# Patient Record
Sex: FEMALE | Race: WHITE | NOT HISPANIC OR LATINO | Employment: OTHER | URBAN - METROPOLITAN AREA
[De-identification: names, ages, dates, MRNs, and addresses within clinical notes are randomized per-mention and may not be internally consistent; named-entity substitution may affect disease eponyms.]

---

## 2019-08-05 ENCOUNTER — HOSPITAL ENCOUNTER (INPATIENT)
Facility: CLINIC | Age: 64
LOS: 1 days | Discharge: HOME | DRG: 393 | End: 2019-08-07
Attending: EMERGENCY MEDICINE | Admitting: INTERNAL MEDICINE
Payer: COMMERCIAL

## 2019-08-05 DIAGNOSIS — K83.09 CHOLANGITIS (H): ICD-10-CM

## 2019-08-05 DIAGNOSIS — K85.10 ACUTE BILIARY PANCREATITIS, UNSPECIFIED COMPLICATION STATUS: ICD-10-CM

## 2019-08-05 LAB
BASOPHILS # BLD AUTO: 0.1 10E9/L (ref 0–0.2)
BASOPHILS NFR BLD AUTO: 0.6 %
DIFFERENTIAL METHOD BLD: NORMAL
EOSINOPHIL # BLD AUTO: 0.1 10E9/L (ref 0–0.7)
EOSINOPHIL NFR BLD AUTO: 1.5 %
ERYTHROCYTE [DISTWIDTH] IN BLOOD BY AUTOMATED COUNT: 13.2 % (ref 10–15)
HCT VFR BLD AUTO: 45.4 % (ref 35–47)
HGB BLD-MCNC: 15.3 G/DL (ref 11.7–15.7)
IMM GRANULOCYTES # BLD: 0 10E9/L (ref 0–0.4)
IMM GRANULOCYTES NFR BLD: 0.3 %
LYMPHOCYTES # BLD AUTO: 1.7 10E9/L (ref 0.8–5.3)
LYMPHOCYTES NFR BLD AUTO: 18.6 %
MCH RBC QN AUTO: 31.6 PG (ref 26.5–33)
MCHC RBC AUTO-ENTMCNC: 33.7 G/DL (ref 31.5–36.5)
MCV RBC AUTO: 94 FL (ref 78–100)
MONOCYTES # BLD AUTO: 0.6 10E9/L (ref 0–1.3)
MONOCYTES NFR BLD AUTO: 6.3 %
NEUTROPHILS # BLD AUTO: 6.4 10E9/L (ref 1.6–8.3)
NEUTROPHILS NFR BLD AUTO: 72.7 %
NRBC # BLD AUTO: 0 10*3/UL
NRBC BLD AUTO-RTO: 0 /100
PLATELET # BLD AUTO: 231 10E9/L (ref 150–450)
RBC # BLD AUTO: 4.84 10E12/L (ref 3.8–5.2)
WBC # BLD AUTO: 8.9 10E9/L (ref 4–11)

## 2019-08-05 PROCEDURE — 83690 ASSAY OF LIPASE: CPT | Performed by: EMERGENCY MEDICINE

## 2019-08-05 PROCEDURE — 93005 ELECTROCARDIOGRAM TRACING: CPT

## 2019-08-05 PROCEDURE — 83605 ASSAY OF LACTIC ACID: CPT | Performed by: EMERGENCY MEDICINE

## 2019-08-05 PROCEDURE — 25000128 H RX IP 250 OP 636

## 2019-08-05 PROCEDURE — 85025 COMPLETE CBC W/AUTO DIFF WBC: CPT | Performed by: EMERGENCY MEDICINE

## 2019-08-05 PROCEDURE — 80053 COMPREHEN METABOLIC PANEL: CPT | Performed by: EMERGENCY MEDICINE

## 2019-08-05 PROCEDURE — 25000128 H RX IP 250 OP 636: Performed by: EMERGENCY MEDICINE

## 2019-08-05 PROCEDURE — 87040 BLOOD CULTURE FOR BACTERIA: CPT | Performed by: EMERGENCY MEDICINE

## 2019-08-05 PROCEDURE — 99223 1ST HOSP IP/OBS HIGH 75: CPT | Mod: AI | Performed by: INTERNAL MEDICINE

## 2019-08-05 PROCEDURE — 84484 ASSAY OF TROPONIN QUANT: CPT | Performed by: EMERGENCY MEDICINE

## 2019-08-05 PROCEDURE — 82805 BLOOD GASES W/O2 SATURATION: CPT | Performed by: EMERGENCY MEDICINE

## 2019-08-05 PROCEDURE — 96361 HYDRATE IV INFUSION ADD-ON: CPT

## 2019-08-05 PROCEDURE — 99285 EMERGENCY DEPT VISIT HI MDM: CPT | Mod: 25

## 2019-08-05 PROCEDURE — 96375 TX/PRO/DX INJ NEW DRUG ADDON: CPT

## 2019-08-05 RX ORDER — HYDROMORPHONE HYDROCHLORIDE 1 MG/ML
INJECTION, SOLUTION INTRAMUSCULAR; INTRAVENOUS; SUBCUTANEOUS
Status: COMPLETED
Start: 2019-08-05 | End: 2019-08-05

## 2019-08-05 RX ORDER — HYDROMORPHONE HYDROCHLORIDE 1 MG/ML
0.5 INJECTION, SOLUTION INTRAMUSCULAR; INTRAVENOUS; SUBCUTANEOUS
Status: DISCONTINUED | OUTPATIENT
Start: 2019-08-05 | End: 2019-08-06

## 2019-08-05 RX ORDER — SODIUM CHLORIDE 9 MG/ML
1000 INJECTION, SOLUTION INTRAVENOUS CONTINUOUS
Status: DISCONTINUED | OUTPATIENT
Start: 2019-08-05 | End: 2019-08-07 | Stop reason: HOSPADM

## 2019-08-05 RX ORDER — ONDANSETRON 2 MG/ML
4 INJECTION INTRAMUSCULAR; INTRAVENOUS ONCE
Status: COMPLETED | OUTPATIENT
Start: 2019-08-05 | End: 2019-08-06

## 2019-08-05 RX ORDER — ONDANSETRON 2 MG/ML
INJECTION INTRAMUSCULAR; INTRAVENOUS
Status: COMPLETED
Start: 2019-08-05 | End: 2019-08-06

## 2019-08-05 RX ADMIN — SODIUM CHLORIDE 1000 ML: 9 INJECTION, SOLUTION INTRAVENOUS at 23:50

## 2019-08-05 RX ADMIN — HYDROMORPHONE HYDROCHLORIDE 0.5 MG: 1 INJECTION, SOLUTION INTRAMUSCULAR; INTRAVENOUS; SUBCUTANEOUS at 23:50

## 2019-08-05 ASSESSMENT — ENCOUNTER SYMPTOMS
NAUSEA: 1
ABDOMINAL PAIN: 1
FEVER: 0
DIARRHEA: 0
CONSTIPATION: 0

## 2019-08-06 PROBLEM — K80.50 CHOLEDOCHOLITHIASIS: Status: ACTIVE | Noted: 2019-08-06

## 2019-08-06 LAB
ALBUMIN SERPL-MCNC: 3.4 G/DL (ref 3.4–5)
ALBUMIN SERPL-MCNC: 4 G/DL (ref 3.4–5)
ALP SERPL-CCNC: 270 U/L (ref 40–150)
ALP SERPL-CCNC: 330 U/L (ref 40–150)
ALT SERPL W P-5'-P-CCNC: 1000 U/L (ref 0–50)
ALT SERPL W P-5'-P-CCNC: 885 U/L (ref 0–50)
ANION GAP SERPL CALCULATED.3IONS-SCNC: 6 MMOL/L (ref 3–14)
ANION GAP SERPL CALCULATED.3IONS-SCNC: 9 MMOL/L (ref 3–14)
AST SERPL W P-5'-P-CCNC: 546 U/L (ref 0–45)
AST SERPL W P-5'-P-CCNC: 592 U/L (ref 0–45)
BASE EXCESS BLDV CALC-SCNC: 1.3 MMOL/L
BASOPHILS # BLD AUTO: 0 10E9/L (ref 0–0.2)
BASOPHILS NFR BLD AUTO: 0.3 %
BILIRUB SERPL-MCNC: 4.3 MG/DL (ref 0.2–1.3)
BILIRUB SERPL-MCNC: 5.1 MG/DL (ref 0.2–1.3)
BUN SERPL-MCNC: 8 MG/DL (ref 7–30)
BUN SERPL-MCNC: 9 MG/DL (ref 7–30)
CALCIUM SERPL-MCNC: 8.6 MG/DL (ref 8.5–10.1)
CALCIUM SERPL-MCNC: 9.5 MG/DL (ref 8.5–10.1)
CHLORIDE SERPL-SCNC: 106 MMOL/L (ref 94–109)
CHLORIDE SERPL-SCNC: 109 MMOL/L (ref 94–109)
CO2 SERPL-SCNC: 24 MMOL/L (ref 20–32)
CO2 SERPL-SCNC: 26 MMOL/L (ref 20–32)
CREAT SERPL-MCNC: 0.7 MG/DL (ref 0.52–1.04)
CREAT SERPL-MCNC: 0.75 MG/DL (ref 0.52–1.04)
DIFFERENTIAL METHOD BLD: NORMAL
EOSINOPHIL # BLD AUTO: 0.1 10E9/L (ref 0–0.7)
EOSINOPHIL NFR BLD AUTO: 0.5 %
ERYTHROCYTE [DISTWIDTH] IN BLOOD BY AUTOMATED COUNT: 13.4 % (ref 10–15)
GFR SERPL CREATININE-BSD FRML MDRD: 85 ML/MIN/{1.73_M2}
GFR SERPL CREATININE-BSD FRML MDRD: >90 ML/MIN/{1.73_M2}
GLUCOSE SERPL-MCNC: 122 MG/DL (ref 70–99)
GLUCOSE SERPL-MCNC: 144 MG/DL (ref 70–99)
HCO3 BLDV-SCNC: 23 MMOL/L (ref 21–28)
HCT VFR BLD AUTO: 41.5 % (ref 35–47)
HGB BLD-MCNC: 13.4 G/DL (ref 11.7–15.7)
IMM GRANULOCYTES # BLD: 0 10E9/L (ref 0–0.4)
IMM GRANULOCYTES NFR BLD: 0.3 %
INR PPP: 0.88 (ref 0.86–1.14)
INTERPRETATION ECG - MUSE: NORMAL
LACTATE BLD-SCNC: 1.5 MMOL/L (ref 0.7–2)
LIPASE SERPL-CCNC: ABNORMAL U/L (ref 73–393)
LYMPHOCYTES # BLD AUTO: 1.1 10E9/L (ref 0.8–5.3)
LYMPHOCYTES NFR BLD AUTO: 12.5 %
MCH RBC QN AUTO: 31.2 PG (ref 26.5–33)
MCHC RBC AUTO-ENTMCNC: 32.3 G/DL (ref 31.5–36.5)
MCV RBC AUTO: 97 FL (ref 78–100)
MONOCYTES # BLD AUTO: 0.6 10E9/L (ref 0–1.3)
MONOCYTES NFR BLD AUTO: 6.6 %
NEUTROPHILS # BLD AUTO: 7.3 10E9/L (ref 1.6–8.3)
NEUTROPHILS NFR BLD AUTO: 79.8 %
NRBC # BLD AUTO: 0 10*3/UL
NRBC BLD AUTO-RTO: 0 /100
O2/TOTAL GAS SETTING VFR VENT: ABNORMAL %
OXYHGB MFR BLDV: 87 %
PCO2 BLDV: 28 MM HG (ref 40–50)
PH BLDV: 7.53 PH (ref 7.32–7.43)
PLATELET # BLD AUTO: 183 10E9/L (ref 150–450)
PO2 BLDV: 45 MM HG (ref 25–47)
POTASSIUM SERPL-SCNC: 3.9 MMOL/L (ref 3.4–5.3)
POTASSIUM SERPL-SCNC: 4 MMOL/L (ref 3.4–5.3)
PROT SERPL-MCNC: 6.1 G/DL (ref 6.8–8.8)
PROT SERPL-MCNC: 7.5 G/DL (ref 6.8–8.8)
RBC # BLD AUTO: 4.3 10E12/L (ref 3.8–5.2)
SODIUM SERPL-SCNC: 139 MMOL/L (ref 133–144)
SODIUM SERPL-SCNC: 141 MMOL/L (ref 133–144)
TROPONIN I SERPL-MCNC: <0.015 UG/L (ref 0–0.04)
WBC # BLD AUTO: 9.1 10E9/L (ref 4–11)

## 2019-08-06 PROCEDURE — 85610 PROTHROMBIN TIME: CPT | Performed by: PHYSICIAN ASSISTANT

## 2019-08-06 PROCEDURE — 80053 COMPREHEN METABOLIC PANEL: CPT | Performed by: INTERNAL MEDICINE

## 2019-08-06 PROCEDURE — 96365 THER/PROPH/DIAG IV INF INIT: CPT

## 2019-08-06 PROCEDURE — 36415 COLL VENOUS BLD VENIPUNCTURE: CPT | Performed by: PHYSICIAN ASSISTANT

## 2019-08-06 PROCEDURE — 12000000 ZZH R&B MED SURG/OB

## 2019-08-06 PROCEDURE — 25000128 H RX IP 250 OP 636: Performed by: INTERNAL MEDICINE

## 2019-08-06 PROCEDURE — 96375 TX/PRO/DX INJ NEW DRUG ADDON: CPT

## 2019-08-06 PROCEDURE — 36415 COLL VENOUS BLD VENIPUNCTURE: CPT | Performed by: EMERGENCY MEDICINE

## 2019-08-06 PROCEDURE — 85025 COMPLETE CBC W/AUTO DIFF WBC: CPT | Performed by: INTERNAL MEDICINE

## 2019-08-06 PROCEDURE — 25800030 ZZH RX IP 258 OP 636: Performed by: INTERNAL MEDICINE

## 2019-08-06 PROCEDURE — 87040 BLOOD CULTURE FOR BACTERIA: CPT | Performed by: EMERGENCY MEDICINE

## 2019-08-06 PROCEDURE — 36415 COLL VENOUS BLD VENIPUNCTURE: CPT | Performed by: INTERNAL MEDICINE

## 2019-08-06 PROCEDURE — 96376 TX/PRO/DX INJ SAME DRUG ADON: CPT

## 2019-08-06 PROCEDURE — 25000128 H RX IP 250 OP 636: Performed by: EMERGENCY MEDICINE

## 2019-08-06 PROCEDURE — 25000128 H RX IP 250 OP 636

## 2019-08-06 PROCEDURE — 99232 SBSQ HOSP IP/OBS MODERATE 35: CPT | Performed by: INTERNAL MEDICINE

## 2019-08-06 PROCEDURE — 25000131 ZZH RX MED GY IP 250 OP 636 PS 637: Performed by: INTERNAL MEDICINE

## 2019-08-06 RX ORDER — NALOXONE HYDROCHLORIDE 0.4 MG/ML
.1-.4 INJECTION, SOLUTION INTRAMUSCULAR; INTRAVENOUS; SUBCUTANEOUS
Status: DISCONTINUED | OUTPATIENT
Start: 2019-08-06 | End: 2019-08-07 | Stop reason: HOSPADM

## 2019-08-06 RX ORDER — ONDANSETRON 4 MG/1
4 TABLET, ORALLY DISINTEGRATING ORAL EVERY 6 HOURS PRN
Status: DISCONTINUED | OUTPATIENT
Start: 2019-08-06 | End: 2019-08-07 | Stop reason: HOSPADM

## 2019-08-06 RX ORDER — ACETAMINOPHEN 325 MG/1
650 TABLET ORAL EVERY 4 HOURS PRN
Status: DISCONTINUED | OUTPATIENT
Start: 2019-08-06 | End: 2019-08-07

## 2019-08-06 RX ORDER — METOCLOPRAMIDE HYDROCHLORIDE 5 MG/ML
10 INJECTION INTRAMUSCULAR; INTRAVENOUS ONCE
Status: COMPLETED | OUTPATIENT
Start: 2019-08-06 | End: 2019-08-06

## 2019-08-06 RX ORDER — HYDRALAZINE HYDROCHLORIDE 20 MG/ML
10 INJECTION INTRAMUSCULAR; INTRAVENOUS EVERY 4 HOURS PRN
Status: DISCONTINUED | OUTPATIENT
Start: 2019-08-06 | End: 2019-08-07 | Stop reason: HOSPADM

## 2019-08-06 RX ORDER — LOSARTAN POTASSIUM 25 MG/1
25 TABLET ORAL EVERY EVENING
COMMUNITY

## 2019-08-06 RX ORDER — DIPHENHYDRAMINE HYDROCHLORIDE 50 MG/ML
25 INJECTION INTRAMUSCULAR; INTRAVENOUS ONCE
Status: COMPLETED | OUTPATIENT
Start: 2019-08-06 | End: 2019-08-06

## 2019-08-06 RX ORDER — HYDROMORPHONE HYDROCHLORIDE 1 MG/ML
.3-.5 INJECTION, SOLUTION INTRAMUSCULAR; INTRAVENOUS; SUBCUTANEOUS
Status: DISCONTINUED | OUTPATIENT
Start: 2019-08-06 | End: 2019-08-07 | Stop reason: HOSPADM

## 2019-08-06 RX ORDER — ONDANSETRON 2 MG/ML
4 INJECTION INTRAMUSCULAR; INTRAVENOUS EVERY 6 HOURS PRN
Status: DISCONTINUED | OUTPATIENT
Start: 2019-08-06 | End: 2019-08-07 | Stop reason: HOSPADM

## 2019-08-06 RX ORDER — DIMENHYDRINATE 50 MG/ML
25 INJECTION, SOLUTION INTRAMUSCULAR; INTRAVENOUS EVERY 6 HOURS PRN
Status: DISCONTINUED | OUTPATIENT
Start: 2019-08-06 | End: 2019-08-07 | Stop reason: HOSPADM

## 2019-08-06 RX ORDER — LIDOCAINE 40 MG/G
CREAM TOPICAL
Status: DISCONTINUED | OUTPATIENT
Start: 2019-08-06 | End: 2019-08-07 | Stop reason: HOSPADM

## 2019-08-06 RX ORDER — LEVOTHYROXINE SODIUM 112 UG/1
112 TABLET ORAL DAILY
COMMUNITY

## 2019-08-06 RX ORDER — METOCLOPRAMIDE HYDROCHLORIDE 5 MG/ML
5-10 INJECTION INTRAMUSCULAR; INTRAVENOUS EVERY 6 HOURS PRN
Status: DISCONTINUED | OUTPATIENT
Start: 2019-08-06 | End: 2019-08-07 | Stop reason: HOSPADM

## 2019-08-06 RX ORDER — SODIUM CHLORIDE, SODIUM LACTATE, POTASSIUM CHLORIDE, CALCIUM CHLORIDE 600; 310; 30; 20 MG/100ML; MG/100ML; MG/100ML; MG/100ML
INJECTION, SOLUTION INTRAVENOUS CONTINUOUS
Status: DISCONTINUED | OUTPATIENT
Start: 2019-08-06 | End: 2019-08-07 | Stop reason: HOSPADM

## 2019-08-06 RX ADMIN — HYDROMORPHONE HYDROCHLORIDE 0.5 MG: 1 INJECTION, SOLUTION INTRAMUSCULAR; INTRAVENOUS; SUBCUTANEOUS at 21:56

## 2019-08-06 RX ADMIN — HYDROMORPHONE HYDROCHLORIDE 0.5 MG: 1 INJECTION, SOLUTION INTRAMUSCULAR; INTRAVENOUS; SUBCUTANEOUS at 06:55

## 2019-08-06 RX ADMIN — DIPHENHYDRAMINE HYDROCHLORIDE 25 MG: 50 INJECTION, SOLUTION INTRAMUSCULAR; INTRAVENOUS at 01:05

## 2019-08-06 RX ADMIN — DIMENHYDRINATE 25 MG: 50 INJECTION, SOLUTION INTRAMUSCULAR; INTRAVENOUS at 21:52

## 2019-08-06 RX ADMIN — HYDROMORPHONE HYDROCHLORIDE 0.5 MG: 1 INJECTION, SOLUTION INTRAMUSCULAR; INTRAVENOUS; SUBCUTANEOUS at 17:32

## 2019-08-06 RX ADMIN — HYDROMORPHONE HYDROCHLORIDE 0.5 MG: 1 INJECTION, SOLUTION INTRAMUSCULAR; INTRAVENOUS; SUBCUTANEOUS at 11:52

## 2019-08-06 RX ADMIN — HYDROMORPHONE HYDROCHLORIDE 0.5 MG: 1 INJECTION, SOLUTION INTRAMUSCULAR; INTRAVENOUS; SUBCUTANEOUS at 04:45

## 2019-08-06 RX ADMIN — HYDROMORPHONE HYDROCHLORIDE 0.5 MG: 1 INJECTION, SOLUTION INTRAMUSCULAR; INTRAVENOUS; SUBCUTANEOUS at 14:42

## 2019-08-06 RX ADMIN — TAZOBACTAM SODIUM AND PIPERACILLIN SODIUM 3.38 G: 375; 3 INJECTION, SOLUTION INTRAVENOUS at 00:48

## 2019-08-06 RX ADMIN — TAZOBACTAM SODIUM AND PIPERACILLIN SODIUM 3.38 G: 375; 3 INJECTION, SOLUTION INTRAVENOUS at 06:33

## 2019-08-06 RX ADMIN — METOCLOPRAMIDE 10 MG: 5 INJECTION, SOLUTION INTRAMUSCULAR; INTRAVENOUS at 17:29

## 2019-08-06 RX ADMIN — TAZOBACTAM SODIUM AND PIPERACILLIN SODIUM 3.38 G: 375; 3 INJECTION, SOLUTION INTRAVENOUS at 11:52

## 2019-08-06 RX ADMIN — TAZOBACTAM SODIUM AND PIPERACILLIN SODIUM 3.38 G: 375; 3 INJECTION, SOLUTION INTRAVENOUS at 18:23

## 2019-08-06 RX ADMIN — SODIUM CHLORIDE 1000 ML: 9 INJECTION, SOLUTION INTRAVENOUS at 01:05

## 2019-08-06 RX ADMIN — ONDANSETRON 4 MG: 4 TABLET, ORALLY DISINTEGRATING ORAL at 06:32

## 2019-08-06 RX ADMIN — ONDANSETRON 4 MG: 2 INJECTION INTRAMUSCULAR; INTRAVENOUS at 00:14

## 2019-08-06 RX ADMIN — ONDANSETRON HYDROCHLORIDE 4 MG: 2 INJECTION, SOLUTION INTRAMUSCULAR; INTRAVENOUS at 15:08

## 2019-08-06 RX ADMIN — HYDROMORPHONE HYDROCHLORIDE 0.5 MG: 1 INJECTION, SOLUTION INTRAMUSCULAR; INTRAVENOUS; SUBCUTANEOUS at 00:51

## 2019-08-06 RX ADMIN — METOCLOPRAMIDE 10 MG: 5 INJECTION, SOLUTION INTRAMUSCULAR; INTRAVENOUS at 01:05

## 2019-08-06 RX ADMIN — SODIUM CHLORIDE, POTASSIUM CHLORIDE, SODIUM LACTATE AND CALCIUM CHLORIDE: 600; 310; 30; 20 INJECTION, SOLUTION INTRAVENOUS at 01:38

## 2019-08-06 RX ADMIN — HYDROMORPHONE HYDROCHLORIDE 0.5 MG: 1 INJECTION, SOLUTION INTRAMUSCULAR; INTRAVENOUS; SUBCUTANEOUS at 02:50

## 2019-08-06 ASSESSMENT — ACTIVITIES OF DAILY LIVING (ADL)
ADLS_ACUITY_SCORE: 13

## 2019-08-06 ASSESSMENT — MIFFLIN-ST. JEOR: SCORE: 847.08

## 2019-08-06 NOTE — CONSULTS
GASTROENTEROLOGY CONSULTATION      Caryn Finley  [address] [facility] [id number]  63 year old female     Admission Date/Time: 8/5/2019  Primary Care Provider: Tawny Cuenca     We were asked to see the patient in consultation by Dr. Lovelace for evaluation of pancreatitis.    CC: epigastric/LUQ pain     HPI:  Caryn Finley is a 63 year old female with past medical history significnat for hypertension and hypothyroidism, s/p cholecystectomy presenting to the hospital with upper abdominal pain.  Patient reports 5 days ago she developed upper abdominal discomfort, however, felt likely gas pain, with associated fatigue. She went to her PCP yesterday to further evaluate. In regards to the pain, this acutely worsened yesterday evening with more localization to the LUQ with radiation to the back. No nausea at home but has had a few episodes since admission. Noted dark urine yesterday but normal colored stools. No fevers or chills.     Workup at PCP office reportedly showed elevated transaminases although acute hepatitis panel was negative. Went to [facility] due to these abnormalities and ultrasound showed dilated common bile duct of 1.3 cm and fatty liver. She was directed to the ER. Labs on admission showed elevated lipase 29,198, total bili  5.1, elevated transaminases with ALT 1000, , with normal WBC, HGB, platelets.     Patient denies alcohol use or any new medications. Reports cholecystectomy 13 years ago related to symptomatic cholelithiasis.      PAST MEDICAL HISTORY:  Patient Active Problem List    Diagnosis Date Noted     Choledocholithiasis 08/06/2019     Priority: Medium     Other disorder of menstruation and other abnormal bleeding from female genital tract 07/05/2007     Priority: Medium     ROS: A comprehensive ten point review of systems was negative aside from those in mentioned in the HPI.       MEDICATIONS:      Prior to Admission medications    Medication Sig  "Last Dose Taking? Auth Provider   CALCIUM 500 MG OR TABS ONE TABLET TWICE DAILY 8/4/2019 at unknown time Yes Stevenson Galeana MD   levothyroxine (SYNTHROID/LEVOTHROID) 112 MCG tablet Take 112 mcg by mouth daily 8/5/2019 at am Yes Unknown, Entered By History   losartan (COZAAR) 25 MG tablet Take 25 mg by mouth every evening 8/5/2019 at pm Yes Unknown, Entered By History   NONFORMULARY           ALLERGIES:   Allergies   Allergen Reactions     Sulfa Drugs Swelling     Tongue,lips        SOCIAL HISTORY:  Social History     Tobacco Use     Smoking status: Not on file   Substance Use Topics     Alcohol use: Not on file     Drug use: Not on file        FAMILY HISTORY:  Reviewed, noncontributory.     PHYSICAL EXAM:   BP (!) 150/76 (BP Location: Left arm)   Pulse 80   Temp 97.5  F (36.4  C) (Oral)   Resp 16   Ht 0.64 m (2' 1.2\")   Wt 91.8 kg (202 lb 6.4 oz)   SpO2 95%   .14 kg/m       PHYSICAL EXAM:  General: alert, oriented, NAD  SKIN: no suspicious lesions, rashes, jaundice, or spider angiomas  HEAD: Normocephalic. No masses, lesions, tenderness or abnormalities  NECK: Neck supple. No adenopathy. Thyroid symmetric, normal size.  EYES: No scleral icterus  ENT: ENT exam normal, no neck nodes or sinus tenderness  RESPIRATORY: negative, Good diaphragmatic excursion. Lungs clear  CARDIOVASCULAR: negative, PMI normal. No lifts, heaves, or thrills. RRR. No murmurs, clicks gallops or rub  GASTROINTESTINAL: +BS, soft, NT, ND, no HSM, no masses/guarding/rebound  JOINT/EXTREMITIES: extremities normal- no gross deformities noted, gait normal and normal muscle tone  NEURO: Reflexes grossly normal and symmetric. Sensation grossly WNL.  PSYCH: no abnormal anxiety/depression  LYMPH: No anterior cervical, posterior cervical, or supraclavicular adenopathy     LABS:  I reviewed the patient's new clinical lab test results.   Recent Labs   Lab Test 08/06/19 0719 08/05/19  7318   WBC 9.1 8.9   HGB 13.4 15.3   MCV 97 94   PLT " 183 231     Recent Labs   Lab Test 08/06/19  0719 08/05/19  2258    139   POTASSIUM 3.9 4.0   CHLORIDE 109 106   CO2 26 24   BUN 8 9   ANIONGAP 6 9   ELIA 8.6 9.5     Recent Labs   Lab Test 08/06/19  0719 08/05/19  2258   ALBUMIN 3.4 4.0   BILITOTAL 4.3* 5.1*   * 1,000*   * 592*   ALKPHOS 270* 330*   LIPASE  --  29,198*        IMAGING  I personally reviewed the patient's new imaging results.    Through Care Everywhere ([facility]):  Michael, Rad Results In - 08/05/2019  2:24 PM CDT  IMPRESSION  COMPARISON:  None.    FINDINGS:    Aorta and IVC: Appears unremarkable..    Pancreas: Partially obscured but visualized portions are unremarkable.    Liver: Contour appears unremarkable.  Increased echogenicity. No definite intrahepatic duct dilation. There is a questionable 1.8 x 2.0 x 0.8 cm hypoechoic area juana hepatis not well visualized by ultrasound but may represent focal sparing.    CBD: 1.3 cm.    Gallbladder: Surgically absent.    Sonographic Nagy's Sign: No.    Right Kidney: Measures 10.9 x 4.4 x 4.7 cm. Appears unremarkable.    Left Kidney: Measures  10.8 x 4.8 x 4.9 cm. Appears unremarkable.    Spleen: Appears unremarkable.  Measures 11.4 cm.    Ascites: None.    IMPRESSION:      1. Fatty infiltration of liver.  2. Dilated extrahepatic common bile duct measuring 13 mm. In the setting of elevated bilirubin, recommend further evaluation with MRCP.     CONSULTATION ASSESSMENT AND PLAN:    63 year old female with past medical history significnat for hypertension and hypothyroidism, s/p cholecystectomy presenting to the hospital with upper abdominal pain. Workup showed elevated lipase, elevated bilirubin, LFTs, with outpatient US showing dilated common bile duct.     1. LUQ abdominal pain secondary to gall stone pancreatitis. No new or inciting meds or alcohol use. Prior cholecystectomy 13 years ago for symptomatic cholelithiasis. No signs of cholangitis at this time. Has been placed on empiric  Zosyn. Clinically pain controlled with pain medication. LFTs and bili down slightly today.   --Will need ERCP although procedure not available at [facility] until Thursday. I was contacted by our biliary staff, Dr. Reese. No time available for procedure at [facility] today but can schedule for tomorrow.   --Continue IV Zosyn.   --Continue IVFs and prn antiemetics/analgesia prn.   --Add on INR.   --NPO.   --ERCP scheduled at 1pm at [facility] tomorrow. [facility] staff to arrange transfer. Patient will return to [facility] following procedure.   --Updated Dr. Gomez and nursing staff on plans.     Thank you for asking us to participate in the care of this patient.    Andree Wen PA-C  [facility]    -----------------------  I agree with the assessment and plan of Andree Wen.  Patient reports she is having abdominal pain and needing dilaudid every 2 hours.  The dilaudid is helping with her pain.  On exam she does appear in mild distress.  Has tenderness to palpation of the abdomen.      Gallstone pancreatitis.  Agree with aggressive IV hydration as she is currently received.  I note she was bolused with 2 liters of IVFs and is now on 225 ml/hr of IVFs.  Pain control per primary team.  ERCP planned tomorrow at 1 pm at [facility].  We will continue to follow.    Shyann Nichols MD  [facility]

## 2019-08-06 NOTE — PLAN OF CARE
Pt is up with standby assist and walker . Pt is voiding . Pts pain is controlled with IV dilaudid.  Pt is tolerating small amount of clears.  Pt will go to [facility] tomorrow at 11:00 for a ERCP.

## 2019-08-06 NOTE — PLAN OF CARE
Pt arrived to room 600  at 0130 via cart with belongings. Oriented to room and call light. Given welcome packet, reviewed info with patient. A&O x4.  VSS. LS CTA all fields. BS active x4, epigastric, RUQ, and LUQ abdominal pain and discomfort controlled with IV dilaudid. NPO for GI consult. IV zosyn for abx. LFT  and Lipase elevated. Dark urine. Denies nausea on the floor. Had antiemtics in ER, given peppermint aromatherapy. Up with SBA and GB. Will continue to monitor.

## 2019-08-06 NOTE — ED PROVIDER NOTES
History     Chief Complaint:  Abdominal Pain    HPI   Caryn Finley is a 63 year old female with a history of hypertension, pancreatitis, S/P cholecystectomy who presents to the emergency department today with abdominal pain. The patient had a routine colonoscopy on Tuesday and since Thursday night has had increasing abdominal pain, that feels especially severe, stabbing, sharp, and constant on the left side and radiating to the back. The patient went to a clinic today and was sent to the [facility] for US. After this she went home and now presents to the ER due to severe pain. She reports some nausea and took some Zofran, but continues to not feel well. She denies diarrhea, constipation, dysuria, fever. She does she drinks 1-2 glasses of red wine per day.      Laboratory results 8/5/19  INR: 0.9  Protime: 12.6  Hepatitis antibodies: negative   CMP: 108 (H) Glucose o/w WNL (Creatinine 0.81)   CBC: AWNL (WBC 5.1, HGB 14.3, )   Liver studies: Alkaline phosphatase: 294(H)  Bilirubin total: 4.1(H)  Bilirubin direct: 3.0 (H(  AST: 569 (H)   ALT: 919 (H)   Protein total: 7.0   Albumin: 4.0   UA: clear, yellow urine with moderate bilirubin and trace Leukocyte esterase o/w WNL      US Abdomen 8/5/19   IMPRESSION:    1. Fatty infiltration of liver.  2. Dilated extrahepatic common bile duct measuring 13 mm. In the setting of elevated bilirubin, recommend further evaluation with MRCP.    Allergies:  Sulfa Drugs     Medications:    Glucosamine  Levothyroxine sodium  Premarin   Midrin  Difluxan  Synthroid  Cozaar     Past Medical History:    Migraine   Hypothyroidism  Goiter   Pancreatitis   Cholelithiasis   Hypertension     Past Surgical History:    Tonsillectomy  Maxillary sinusotomy  Lasik  Laparoscopic cholecystectomy  Partial hysterectomy     Family History:    Diabetes  Hypertension  Breast cancer  Cataracts  Anemia    Social History:  The patient was accompanied to the ED by .  Smoking Status:  "Former  Smokeless Tobacco: Never  Alcohol Use: Yes   Marital Status:      Review of Systems   Constitutional: Negative for fever.   Gastrointestinal: Positive for abdominal pain (worse on left, radiating to back) and nausea. Negative for constipation and diarrhea.   Genitourinary: Negative for dyspareunia.   All other systems reviewed and are negative.        Physical Exam     Patient Vitals for the past 24 hrs:   BP Temp Temp src Pulse Heart Rate Resp SpO2 Height Weight   08/06/19 0130 (!) 157/79 97.1  F (36.2  C) Oral -- 84 16 96 % 0.64 m (2' 1.2\") 91.8 kg (202 lb 6.4 oz)   08/06/19 0105 -- -- -- -- -- -- 96 % -- --   08/06/19 0100 (!) 186/101 -- -- 78 -- -- 97 % -- --   08/06/19 0000 (!) 188/97 -- -- 76 -- -- 98 % -- --   08/05/19 2345 (!) 190/92 -- -- 70 -- -- 99 % -- --   08/05/19 2300 (!) 184/93 -- -- 77 -- -- 99 % -- --   08/05/19 2236 (!) 182/106 98.1  F (36.7  C) Oral -- 93 16 98 % -- 94.3 kg (208 lb)      Physical Exam  Constitutional:  Oriented to person, place, and time. Minor distress due to pain.  HENT:   Head:    Normocephalic.   Mouth/Throat:   Oropharynx is clear and moist.   Eyes:    EOM are normal. Pupils are equal, round, and reactive to light.   Neck:    Neck supple.   Cardiovascular:  Normal rate, regular rhythm and normal heart sounds.      Exam reveals no gallop and no friction rub.       No murmur heard.  Pulmonary/Chest:  Effort normal and breath sounds normal.      No respiratory distress. No wheezes. No rales.      No reproducible chest wall pain.  Abdominal:   Soft. No distension. Epigastric and left upper tenderness. No rebound and no guarding.   Musculoskeletal:  Normal range of motion.   Neurological:   Alert and oriented to person, place, and time.           Moves all 4 extremities spontaneously    Skin:    No rash noted. No pallor.      Emergency Department Course   ECG:  Indication: upper abdominal pain  Completed at 2254.  Read at 2254.   Normal sinus rhythm   Normal ECG  "   Rate 78 bpm. HI interval 156. QRS duration 84. QT/QTc 384/437. P-R-T axes 73 51 73.     Laboratory:  Laboratory findings were communicated with the patient and family who voiced understanding of the findings.  Blood gas venous: pH Venous 7.53, PCO2 Venous 28, PO2 Venous 45, Bicarbonate 23, Base Deficit 1.3, FIO2 Room air, Oxyhemoglobin 87     CBC: AWNL (WBC 8.9, HGB 15.3, )  Troponin (Collected 2258): <0.015  CMP: 144 Glucose, 5.1 bilirubin total, 330 albumin phosphatase, 1,000 ALT, and 592 AST o/w WNL (Creatinine 0.75)   Lipase: 29,198  Lactic Acid: 1.5  Blood cultures: Pending     Interventions:  2350: 0.9% NaCl 1L IV Bolus   2350: Dilaudid 0.5mg IV    0014: Zofran 4mg IV   0048: Zosyn 3.375 g IV   0051: Dilaudid 0.5mg IV    0105: 0.9% NaCl 1L IV Bolus   0105: Benadryl 25mg IV   0105: Reglan 10mg IV     Emergency Department Course:  Nursing notes and vitals reviewed.  2315: I performed an exam of the patient as documented above.   IV was inserted and blood was drawn for laboratory testing, results above.  2326: I spoke with Dr. Robertson of the GI service regarding patient's presentation, findings, and plan of care.   2332: Patient rechecked and updated.    0005: Findings and plan explained to the Patient who consents to admission. Discussed the patient with Dr. Lovelace, who will admit the patient to a Med/Surg bed for further monitoring, evaluation, and treatment.   I personally reviewed the laboratory results with the Patient and answered all related questions prior to admission.   Impression & Plan     Medical Decision Making:  Caryn Finley is a 63 year old female who presents with abdominal pain and elevated liver enzymes. Differential includes hepatitis, choledocholithiasis, cholangitis, or other causes. Workup thus far does show that she has quite elevated liver enzymes, bilirubin with an enlarged common bileduct post cholecystectomy, likely consistent with colingitis. Case was discussed with GI  who does agree with assessment. She has no signs that would suggest severe sepsis, although will be given Zosyn and will be admitted for further GI consultation. Likely ERCP. Lipase is 29,000 consistent with acute pancreatitis, likely secondary to biliary pancreatitis. This will be further managed and treated. Patient admitted to Med/Surg floor.     Critical Care time:  was 35 minutes for this patient excluding procedures.    Diagnosis:    ICD-10-CM    1. Cholangitis K83.09 Comprehensive metabolic panel     Troponin I (now)     Lipase     Blood gas venous and oxyhgb     Blood culture     Blood culture     Lactic acid whole blood     Lactic acid whole blood     CANCELED: Lactic acid whole blood   2. Acute biliary pancreatitis, unspecified complication status K85.10        Disposition:  Admitted to Med/Surg    Scribe Disclosure:  I, Carlyn Delgado, am serving as a scribe at 11:18 PM on 8/5/2019 to document services personally performed by Abdifatah García MD based on my observations and the provider's statements to me.    8/5/2019   [facility] EMERGENCY DEPARTMENT       Abdifatah García MD  08/06/19 0305

## 2019-08-06 NOTE — PROGRESS NOTES
[facility]  Hospitalist Progress Note  Patient Name: Caryn Finley    MRN: [id number]  Provider: Florencio Gomez MD  08/06/19    Initial presenting complaint/issue to hospital (Diagnosis): abdominal pain         Assessment and Plan:      Summary of Stay: Caryn Finley is a 63 year old female with history of hypertension, hypothyroidism, and cholecystectomy 13 years ago. She presented to her [facility] on 8/5/2019 for evaluation of abdominal pain. Labs were obtained and showed elevated LFT's (Earnest T 4.1, alk phos 294, , and ). Serologies for hepatitis (A, B, and C), BMP and CBC were unremarkable. She was sent for US of abdomen which showed dilated common bile duct. Her abdominal pain worsened so she came into the ED later in the night. Labs were similar. Lipase was checked and was elevated at 29,198. She was admitted with suspected choledocholithiasis and gall stone pancreatitis. She was treated with NPO, IVF, analgesics as needed, antiemetics as needed, and Zosyn. GI was consulted and is planning on ERCP at [facility] on 8/7/19.    Problem List:   1. Suspected choledocholithiasis. LFTs are elevated but improved some. Abdominal pain has improved. GI consult appreciated. ERCP is being planned for tomorrow. Clear liquid diet for now, NPO after midnight. Continue analgesics and antiemetics as needed.     2. Gallstone pancreatitis. Abdominal pain has improved. Clear liquids as tolerated. Analgesics and antiemetics as needed. AM Lipase.     3. Hypertension. PTA Losartan is on hold. Continue prn Hydralazine.    4. Hypothyroidism. Resume PTA Levothyroxine.     DVT Prophylaxis:  -  PCD's  Code Status: Full Code  Discharge Dispo: Home  Estimated Disch Date / # of Days until Discharge: likely in 2 days        Interval History:      No new problems. Abdominal pain is improved. Some nausea earlier but better now.                   Physical Exam:      Last Vital Signs:  BP (!) 150/76  "(BP Location: Left arm)   Pulse 80   Temp 97.5  F (36.4  C) (Oral)   Resp 16   Ht 0.64 m (2' 1.2\")   Wt 91.8 kg (202 lb 6.4 oz)   SpO2 95%   .14 kg/m      Intake/Output Summary (Last 24 hours) at 8/6/2019 1018  Last data filed at 8/6/2019 0455  Gross per 24 hour   Intake --   Output 1100 ml   Net -1100 ml       GENERAL:  Comfortable. Cooperative.  PSYCH: pleasant, oriented, No acute distress.  EYES: PERRLA, Normal conjunctiva.  HEART:  Regular rate and rhythm. No JVD. Pulses normal. No edema.  LUNGS:  Clear to auscultation, normal Respiratory effort.  ABDOMEN:  Soft, no hepatosplenomegaly, normal bowel sounds.  EXTREMETIES: No clubbing, cyanosis or ischemia  SKIN:  Dry to touch, No rash.           Medications:      All current medications were reviewed.         Data:      All new lab and imaging data was reviewed.   Labs:       Lab Results   Component Value Date     08/06/2019     08/05/2019     06/21/2007    Lab Results   Component Value Date    CHLORIDE 109 08/06/2019    CHLORIDE 106 08/05/2019    CHLORIDE 110 06/21/2007    Lab Results   Component Value Date    BUN 8 08/06/2019    BUN 9 08/05/2019    BUN 5 06/21/2007      Lab Results   Component Value Date    POTASSIUM 3.9 08/06/2019    POTASSIUM 4.0 08/05/2019    POTASSIUM 4.0 06/22/2007    Lab Results   Component Value Date    CO2 26 08/06/2019    CO2 24 08/05/2019    CO2 25 06/21/2007    Lab Results   Component Value Date    CR 0.70 08/06/2019    CR 0.75 08/05/2019    CR 0.65 06/21/2007        Recent Labs   Lab 08/06/19 0719 08/05/19 2258   WBC 9.1 8.9   HGB 13.4 15.3   HCT 41.5 45.4   MCV 97 94    231     Recent Labs   Lab 08/06/19 0719 08/05/19 2258   * 592*   * 1,000*   ALKPHOS 270* 330*   BILITOTAL 4.3* 5.1*              "

## 2019-08-06 NOTE — ED TRIAGE NOTES
Pt c/o severe upper abdominal pain that started 5 days ago.  Pt notes she had a liver ultrasound today because of bili in her urine, and had [facility].

## 2019-08-06 NOTE — ED NOTES
[facility]  ED Nurse Handoff Report    Caryn Finley is a 63 year old female   ED Chief complaint: Abdominal Pain  . ED Diagnosis:   Final diagnoses:   Cholangitis     Allergies:   Allergies   Allergen Reactions     Sulfa Drugs Swelling     Tongue,lips       Code Status: Full Code  Activity level - Baseline/Home:  Independent. Activity Level - Current:   Stand by Assist. Lift room needed: No. Bariatric: No   Needed: No   Isolation: No. Infection: Not Applicable.     Vital Signs:   Vitals:    08/05/19 2236 08/05/19 2300 08/05/19 2345 08/06/19 0000   BP: (!) 182/106 (!) 184/93 (!) 190/92 (!) 188/97   Pulse:  77 70 76   Resp: 16      Temp: 98.1  F (36.7  C)      TempSrc: Oral      SpO2: 98% 99% 99% 98%   Weight: 94.3 kg (208 lb)          Cardiac Rhythm:  ,      Pain level: 0-10 Pain Scale: 10  Patient confused: No. Patient Falls Risk: Yes.   Elimination Status: Has voided   Patient Report - Initial Complaint: abdominal pain. Focused Assessment:     22:35 ED Triage Notes Filed Pt c/o severe upper abdominal pain that started 5 days ago.  Pt notes she had a liver ultrasound today because of bili in her urine, and had blood work drawn today at a [facility] clinic.       23:03 Gastrointestinal Gastrointestinal - GI WDL: all  GI Signs/Symptoms: nausea; abdominal pain, pain worse on LLQ and LUQ radiating to back, emesis x 1 in ED  KJ     23:03 Respiratory Respiratory - Respiratory WDL: WDL           Tests Performed: labs imaging. Abnormal Results:   Labs Ordered and Resulted from Time of ED Arrival Up to the Time of Departure from the ED   COMPREHENSIVE METABOLIC PANEL - Abnormal; Notable for the following components:       Result Value    Glucose 144 (*)     Bilirubin Total 5.1 (*)     Alkaline Phosphatase 330 (*)     ALT 1,000 (*)      (*)     All other components within normal limits   BLOOD GAS VENOUS AND OXYHGB - Abnormal; Notable for the following components:    Ph Venous 7.53 (*)      PCO2 Venous 28 (*)     All other components within normal limits   CBC WITH PLATELETS DIFFERENTIAL   TROPONIN I   LIPASE   LACTIC ACID WHOLE BLOOD   ISTAT CG4 GASES LACTATE ENRICO NURSING POCT   BLOOD CULTURE   BLOOD CULTURE     No orders to display   US Abdomen 8/5/19   IMPRESSION:    1. Fatty infiltration of liver.  2. Dilated extrahepatic common bile duct measuring 13 mm. In the setting of elevated bilirubin, recommend further evaluation with MRCP  .   Treatments provided: antibiotics, pain control.  Family Comments: spouse at bedside  OBS brochure/video discussed/provided to patient:  No  ED Medications:   Medications   piperacillin-tazobactam (ZOSYN) infusion 3.375 g (has no administration in time range)   0.9% sodium chloride BOLUS (1,000 mLs Intravenous New Bag 8/5/19 2350)   sodium chloride 0.9% infusion (has no administration in time range)   HYDROmorphone (PF) (DILAUDID) injection 0.5 mg (0.5 mg Intravenous Given 8/5/19 2350)   ondansetron (ZOFRAN) injection 4 mg (4 mg Intravenous Given 8/6/19 0014)     Drips infusing:  No  For the majority of the shift, the patient's behavior Green. Interventions performed were n/a.     Severe Sepsis OR Septic Shock Diagnosis Present: No      ED Nurse Name/Phone Number: Mindy Bryan,   12:25 AM    RECEIVING UNIT ED HANDOFF REVIEW    Above ED Nurse Handoff Report was reviewed:Yes  Reviewed by: Jennifer Daly on August 6, 2019 at 1:00 AM

## 2019-08-06 NOTE — PHARMACY-ADMISSION MEDICATION HISTORY
Admission medication history interview status for this patient is complete. See [facility] admission navigator for allergy information, prior to admission medications and immunization status.     Medication history interview source(s):Patient  Medication history resources (including written lists, pill bottles, clinic record):None  [facility]    Changes made to PTA medication list:  Added: losartan, biotic 7  Deleted: ferrous sulfate, premarin, glucosamine   Changed: levothyroxine     Actions taken by pharmacist (provider contacted, etc):None     Additional medication history information:None    Medication reconciliation/reorder completed by provider prior to medication history? No    Do you take OTC medications (eg tylenol, ibuprofen, fish oil, eye/ear drops, etc)? Y (Y/N)    For patients on insulin therapy: N (Y/N)    Prior to Admission medications    Medication Sig Last Dose Taking? Auth Provider   CALCIUM 500 MG OR TABS ONE TABLET TWICE DAILY 8/4/2019 at unknown time Yes Stevenson Galeana MD   levothyroxine (SYNTHROID/LEVOTHROID) 112 MCG tablet Take 112 mcg by mouth daily 8/5/2019 at am Yes Unknown, Entered By History   losartan (COZAAR) 25 MG tablet Take 25 mg by mouth every evening 8/5/2019 at pm Yes Unknown, Entered By History   NONFORMULARY Take 1 capsule by mouth every evening (Biotic 7) 8/5/2019 at pm Yes Unknown, Entered By History

## 2019-08-06 NOTE — H&P
[facility]    History and Physical  Hospitalist       Date of Admission:  8/5/2019    Assessment & Plan   Caryn Finley is a 63 year old female who presents with abdominal pain.  She has a history of cholecystectomy about 13 years ago.    The pain started earlier today, during the day.  She went to her primary care doctor.  LFTs were done and they were elevated.  She also had an hepatitis panel done which was unremarkable.  She was sent for an ultrasound to [facility] which showed evidence of biliary dilatation.  By the time she was feeling much better and went home.  At around 6 PM her pain came back.  At this time it was found worse.  It is in the upper abdomen.  Due to the excruciating pain she came to the emergency room.    In the emergency room she noted to have worsening of her LFTs.  No fever in the emergency room.  No fever at home.    ER physician spoke to the gastroenterologist on-call for suspicion of choledocholithiasis.  They recommended admission for ERCP in the morning.  Patient is being admitted to internal medicine hospitalist service.     Problem List:    Acute choledocholithiasis.  - N.p.o.  Pain medicines.  GI consult for ERCP.  -Empirical Zosyn.  Monitor LFTs.    Hypertension  - Usually takes 25 mg losartan at night.  - Currently the blood pressure is elevated which is likely due to her distress due to the pain.  Control the pain.  Resume losartan.    Hypothyroidism  - Resume levothyroxine once able to take p.o.    Plan discussed with patient and .      DVT Prophylaxis: Pneumatic Compression Devices  Code Status: Full Code    Don Lovelace MD    Primary Care Physician   Tawny Cuenca    Chief Complaint   Abdominal pain      History of Present Illness   Caryn Finley is a 63 year old female presented with abdominal pain.      Past Medical History    Hypertension  Hypothyroidism    Past Surgical History   Cholecystectomy  Prior to Admission Medications    Prior to Admission Medications   Prescriptions Last Dose Informant Patient Reported? Taking?   CALCIUM 500 MG OR TABS   Yes No   Sig: ONE TABLET TWICE DAILY   FERROUS SULFATE 325 (65 FE) MG OR TABS   Yes No   Si TABLET DAILY   GLUCOSAMINE 500 MG OR CAPS   Yes No   LEVOTHYROXINE SODIUM 200 MCG OR TABS   Yes No   Si TABLET DAILY   PREMARIN 1.25 MG OR TABS   No No   Si TABLET DAILY      Facility-Administered Medications: None     Allergies   Allergies   Allergen Reactions     Sulfa Drugs Swelling     Tongue,lips       Social History   Denies any smoking alcohol or illicit drug use.  Lives with her .    Family History   History of coronary artery disease in her brothers.    Review of Systems   The 10 point Review of Systems is negative other than noted in the HPI or here.     Physical Exam   Temp: 98.1  F (36.7  C) Temp src: Oral BP: (!) 188/97 Pulse: 76 Heart Rate: 93 Resp: 16 SpO2: 98 % O2 Device: None (Room air)    Vital Signs with Ranges  Temp:  [98.1  F (36.7  C)] 98.1  F (36.7  C)  Pulse:  [70-77] 76  Heart Rate:  [93] 93  Resp:  [16] 16  BP: (182-190)/() 188/97  SpO2:  [98 %-99 %] 98 %  208 lbs 0 oz    Constitutional: Awake, alert, cooperative, is in some distress due to ongoing pain.  Eyes: Conjunctiva and pupils examined and normal.  HEENT: Dry mucous membranes, normal dentition.  Respiratory: Clear to auscultation bilaterally, no crackles or wheezing.  Cardiovascular: Regular rate and rhythm, normal S1 and S2, and no murmur noted.  GI: Soft, non-distended, upper abdomen tenderness.  No peritoneal signs..  Lymph/Hematologic: No anterior cervical or supraclavicular adenopathy.  Skin: No rashes, no cyanosis, no edema.  Musculoskeletal: No joint swelling, erythema or tenderness.  Neurologic: Cranial nerves 2-12 intact, normal strength and sensation.  Psychiatric: Alert, oriented to person, place and time, no obvious anxiety or depression.    Data     Recent Labs   Lab 19  6687    WBC 8.9   HGB 15.3   MCV 94         POTASSIUM 4.0   CHLORIDE 106   CO2 24   BUN 9   CR 0.75   ANIONGAP 9   ELIA 9.5   *   ALBUMIN 4.0   PROTTOTAL 7.5   BILITOTAL 5.1*   ALKPHOS 330*   ALT 1,000*   *   LIPASE 29,198*   TROPI <0.015       No results found for this or any previous visit (from the past 24 hour(s)).

## 2019-08-07 ENCOUNTER — APPOINTMENT (OUTPATIENT)
Dept: GENERAL RADIOLOGY | Facility: CLINIC | Age: 64
End: 2019-08-07
Attending: INTERNAL MEDICINE
Payer: COMMERCIAL

## 2019-08-07 ENCOUNTER — ANESTHESIA EVENT (OUTPATIENT)
Dept: SURGERY | Facility: CLINIC | Age: 64
End: 2019-08-07
Payer: COMMERCIAL

## 2019-08-07 ENCOUNTER — HOSPITAL ENCOUNTER (INPATIENT)
Facility: CLINIC | Age: 64
LOS: 1 days | Discharge: HOME | DRG: 393 | End: 2019-08-08
Attending: INTERNAL MEDICINE | Admitting: INTERNAL MEDICINE
Payer: COMMERCIAL

## 2019-08-07 ENCOUNTER — HOSPITAL ENCOUNTER (OUTPATIENT)
Facility: CLINIC | Age: 64
Discharge: OTHER INSTITUTION | End: 2019-08-07
Attending: INTERNAL MEDICINE | Admitting: INTERNAL MEDICINE
Payer: COMMERCIAL

## 2019-08-07 ENCOUNTER — ANESTHESIA (OUTPATIENT)
Dept: SURGERY | Facility: CLINIC | Age: 64
End: 2019-08-07
Payer: COMMERCIAL

## 2019-08-07 VITALS
HEART RATE: 72 BPM | SYSTOLIC BLOOD PRESSURE: 157 MMHG | RESPIRATION RATE: 16 BRPM | TEMPERATURE: 98 F | OXYGEN SATURATION: 93 % | DIASTOLIC BLOOD PRESSURE: 92 MMHG

## 2019-08-07 VITALS
HEIGHT: 55 IN | SYSTOLIC BLOOD PRESSURE: 143 MMHG | TEMPERATURE: 99.7 F | WEIGHT: 202.4 LBS | BODY MASS INDEX: 46.84 KG/M2 | OXYGEN SATURATION: 95 % | DIASTOLIC BLOOD PRESSURE: 84 MMHG | RESPIRATION RATE: 16 BRPM | HEART RATE: 76 BPM

## 2019-08-07 LAB
ALBUMIN SERPL-MCNC: 3.1 G/DL (ref 3.4–5)
ALP SERPL-CCNC: 274 U/L (ref 40–150)
ALT SERPL W P-5'-P-CCNC: 698 U/L (ref 0–50)
ANION GAP SERPL CALCULATED.3IONS-SCNC: 6 MMOL/L (ref 3–14)
AST SERPL W P-5'-P-CCNC: 286 U/L (ref 0–45)
BILIRUB SERPL-MCNC: 1.8 MG/DL (ref 0.2–1.3)
BUN SERPL-MCNC: 9 MG/DL (ref 7–30)
CALCIUM SERPL-MCNC: 8.9 MG/DL (ref 8.5–10.1)
CHLORIDE SERPL-SCNC: 107 MMOL/L (ref 94–109)
CO2 SERPL-SCNC: 26 MMOL/L (ref 20–32)
CREAT SERPL-MCNC: 0.69 MG/DL (ref 0.52–1.04)
ERCP: NORMAL
ERYTHROCYTE [DISTWIDTH] IN BLOOD BY AUTOMATED COUNT: 13.2 % (ref 10–15)
GFR SERPL CREATININE-BSD FRML MDRD: >90 ML/MIN/{1.73_M2}
GLUCOSE SERPL-MCNC: 81 MG/DL (ref 70–99)
HCT VFR BLD AUTO: 41.2 % (ref 35–47)
HGB BLD-MCNC: 13 G/DL (ref 11.7–15.7)
LIPASE SERPL-CCNC: 2715 U/L (ref 73–393)
MCH RBC QN AUTO: 31 PG (ref 26.5–33)
MCHC RBC AUTO-ENTMCNC: 31.6 G/DL (ref 31.5–36.5)
MCV RBC AUTO: 98 FL (ref 78–100)
PLATELET # BLD AUTO: 165 10E9/L (ref 150–450)
POTASSIUM SERPL-SCNC: 3.7 MMOL/L (ref 3.4–5.3)
PROT SERPL-MCNC: 6 G/DL (ref 6.8–8.8)
RBC # BLD AUTO: 4.2 10E12/L (ref 3.8–5.2)
SODIUM SERPL-SCNC: 139 MMOL/L (ref 133–144)
WBC # BLD AUTO: 7.9 10E9/L (ref 4–11)

## 2019-08-07 PROCEDURE — 25000128 H RX IP 250 OP 636: Performed by: INTERNAL MEDICINE

## 2019-08-07 PROCEDURE — 85027 COMPLETE CBC AUTOMATED: CPT | Performed by: INTERNAL MEDICINE

## 2019-08-07 PROCEDURE — 25000566 ZZH SEVOFLURANE, EA 15 MIN: Performed by: INTERNAL MEDICINE

## 2019-08-07 PROCEDURE — 92000032 ZZH OUT OF HOSPITAL SERVICE, MISC 920 OPNP

## 2019-08-07 PROCEDURE — 25000125 ZZHC RX 250: Performed by: ANESTHESIOLOGY

## 2019-08-07 PROCEDURE — 36000058 ZZH SURGERY LEVEL 3 EA 15 ADDTL MIN: Performed by: INTERNAL MEDICINE

## 2019-08-07 PROCEDURE — 71000012 ZZH RECOVERY PHASE 1 LEVEL 1 FIRST HR: Performed by: INTERNAL MEDICINE

## 2019-08-07 PROCEDURE — 37000009 ZZH ANESTHESIA TECHNICAL FEE, EACH ADDTL 15 MIN: Performed by: INTERNAL MEDICINE

## 2019-08-07 PROCEDURE — 83690 ASSAY OF LIPASE: CPT | Performed by: INTERNAL MEDICINE

## 2019-08-07 PROCEDURE — 99232 SBSQ HOSP IP/OBS MODERATE 35: CPT | Performed by: INTERNAL MEDICINE

## 2019-08-07 PROCEDURE — 25000132 ZZH RX MED GY IP 250 OP 250 PS 637: Performed by: INTERNAL MEDICINE

## 2019-08-07 PROCEDURE — 25000110 ZZH OUT OF HOSPITAL SERVICE, DRUGS 250 OPNP

## 2019-08-07 PROCEDURE — 71000013 ZZH RECOVERY PHASE 1 LEVEL 1 EA ADDTL HR: Performed by: INTERNAL MEDICINE

## 2019-08-07 PROCEDURE — 40000170 ZZH STATISTIC PRE-PROCEDURE ASSESSMENT II: Performed by: INTERNAL MEDICINE

## 2019-08-07 PROCEDURE — 25000125 ZZHC RX 250: Performed by: PHYSICIAN ASSISTANT

## 2019-08-07 PROCEDURE — 74330 X-RAY BILE/PANC ENDOSCOPY: CPT

## 2019-08-07 PROCEDURE — 36000056 ZZH SURGERY LEVEL 3 1ST 30 MIN: Performed by: INTERNAL MEDICINE

## 2019-08-07 PROCEDURE — C1887 CATHETER, GUIDING: HCPCS | Performed by: INTERNAL MEDICINE

## 2019-08-07 PROCEDURE — 12000000 ZZH R&B MED SURG/OB

## 2019-08-07 PROCEDURE — 27210286 ZZH BALLOON ADDITIONAL: Performed by: INTERNAL MEDICINE

## 2019-08-07 PROCEDURE — 25800030 ZZH RX IP 258 OP 636: Performed by: SURGERY

## 2019-08-07 PROCEDURE — 0F798ZZ DILATION OF COMMON BILE DUCT, VIA NATURAL OR ARTIFICIAL OPENING ENDOSCOPIC: ICD-10-PCS | Performed by: INTERNAL MEDICINE

## 2019-08-07 PROCEDURE — 80053 COMPREHEN METABOLIC PANEL: CPT | Performed by: INTERNAL MEDICINE

## 2019-08-07 PROCEDURE — 25000128 H RX IP 250 OP 636: Performed by: NURSE ANESTHETIST, CERTIFIED REGISTERED

## 2019-08-07 PROCEDURE — 25800030 ZZH RX IP 258 OP 636: Performed by: INTERNAL MEDICINE

## 2019-08-07 PROCEDURE — 36000032

## 2019-08-07 PROCEDURE — 25800030 ZZH RX IP 258 OP 636: Performed by: NURSE ANESTHETIST, CERTIFIED REGISTERED

## 2019-08-07 PROCEDURE — 36415 COLL VENOUS BLD VENIPUNCTURE: CPT | Performed by: INTERNAL MEDICINE

## 2019-08-07 PROCEDURE — 27110023

## 2019-08-07 PROCEDURE — 37000008 ZZH ANESTHESIA TECHNICAL FEE, 1ST 30 MIN: Performed by: INTERNAL MEDICINE

## 2019-08-07 PROCEDURE — C1769 GUIDE WIRE: HCPCS | Performed by: INTERNAL MEDICINE

## 2019-08-07 PROCEDURE — 25000125 ZZHC RX 250: Performed by: NURSE ANESTHETIST, CERTIFIED REGISTERED

## 2019-08-07 PROCEDURE — 32000031

## 2019-08-07 RX ORDER — ONDANSETRON 2 MG/ML
4 INJECTION INTRAMUSCULAR; INTRAVENOUS EVERY 6 HOURS PRN
Status: CANCELLED | OUTPATIENT
Start: 2019-08-07

## 2019-08-07 RX ORDER — ACETAMINOPHEN 650 MG/1
650 SUPPOSITORY RECTAL EVERY 4 HOURS PRN
Status: DISCONTINUED | OUTPATIENT
Start: 2019-08-07 | End: 2019-08-07

## 2019-08-07 RX ORDER — METOCLOPRAMIDE HYDROCHLORIDE 5 MG/ML
5-10 INJECTION INTRAMUSCULAR; INTRAVENOUS EVERY 6 HOURS PRN
Status: CANCELLED | OUTPATIENT
Start: 2019-08-07

## 2019-08-07 RX ORDER — INDOMETHACIN 50 MG/1
100 SUPPOSITORY RECTAL
Status: CANCELLED | OUTPATIENT
Start: 2019-08-07

## 2019-08-07 RX ORDER — SODIUM CHLORIDE 9 MG/ML
1000 INJECTION, SOLUTION INTRAVENOUS CONTINUOUS
Status: DISCONTINUED | OUTPATIENT
Start: 2019-08-07 | End: 2019-08-07

## 2019-08-07 RX ORDER — HYDROMORPHONE HYDROCHLORIDE 1 MG/ML
.3-.5 INJECTION, SOLUTION INTRAMUSCULAR; INTRAVENOUS; SUBCUTANEOUS EVERY 5 MIN PRN
Status: CANCELLED | OUTPATIENT
Start: 2019-08-07

## 2019-08-07 RX ORDER — LIDOCAINE 40 MG/G
CREAM TOPICAL
Status: CANCELLED | OUTPATIENT
Start: 2019-08-07

## 2019-08-07 RX ORDER — PROPOFOL 10 MG/ML
INJECTION, EMULSION INTRAVENOUS CONTINUOUS PRN
Status: DISCONTINUED | OUTPATIENT
Start: 2019-08-07 | End: 2019-08-07

## 2019-08-07 RX ORDER — FENTANYL CITRATE 50 UG/ML
INJECTION, SOLUTION INTRAMUSCULAR; INTRAVENOUS PRN
Status: DISCONTINUED | OUTPATIENT
Start: 2019-08-07 | End: 2019-08-07

## 2019-08-07 RX ORDER — NALOXONE HYDROCHLORIDE 0.4 MG/ML
.1-.4 INJECTION, SOLUTION INTRAMUSCULAR; INTRAVENOUS; SUBCUTANEOUS
Status: CANCELLED | OUTPATIENT
Start: 2019-08-07

## 2019-08-07 RX ORDER — ONDANSETRON 2 MG/ML
4 INJECTION INTRAMUSCULAR; INTRAVENOUS EVERY 30 MIN PRN
Status: CANCELLED | OUTPATIENT
Start: 2019-08-07

## 2019-08-07 RX ORDER — SODIUM CHLORIDE, SODIUM LACTATE, POTASSIUM CHLORIDE, CALCIUM CHLORIDE 600; 310; 30; 20 MG/100ML; MG/100ML; MG/100ML; MG/100ML
INJECTION, SOLUTION INTRAVENOUS CONTINUOUS
Status: DISCONTINUED | OUTPATIENT
Start: 2019-08-07 | End: 2019-08-07 | Stop reason: HOSPADM

## 2019-08-07 RX ORDER — HYDROMORPHONE HYDROCHLORIDE 1 MG/ML
.3-.5 INJECTION, SOLUTION INTRAMUSCULAR; INTRAVENOUS; SUBCUTANEOUS
Status: CANCELLED | OUTPATIENT
Start: 2019-08-07

## 2019-08-07 RX ORDER — ONDANSETRON 4 MG/1
4 TABLET, ORALLY DISINTEGRATING ORAL EVERY 6 HOURS PRN
Status: CANCELLED | OUTPATIENT
Start: 2019-08-07

## 2019-08-07 RX ORDER — LOSARTAN POTASSIUM 25 MG/1
25 TABLET ORAL AT BEDTIME
Status: DISCONTINUED | OUTPATIENT
Start: 2019-08-07 | End: 2019-08-08 | Stop reason: HOSPADM

## 2019-08-07 RX ORDER — LIDOCAINE 40 MG/G
CREAM TOPICAL
Status: DISCONTINUED | OUTPATIENT
Start: 2019-08-07 | End: 2019-08-08 | Stop reason: HOSPADM

## 2019-08-07 RX ORDER — LEVOTHYROXINE SODIUM 112 UG/1
112 TABLET ORAL DAILY
Status: DISCONTINUED | OUTPATIENT
Start: 2019-08-07 | End: 2019-08-08 | Stop reason: HOSPADM

## 2019-08-07 RX ORDER — HYDRALAZINE HYDROCHLORIDE 20 MG/ML
10 INJECTION INTRAMUSCULAR; INTRAVENOUS EVERY 4 HOURS PRN
Status: CANCELLED | OUTPATIENT
Start: 2019-08-07

## 2019-08-07 RX ORDER — PROPOFOL 10 MG/ML
INJECTION, EMULSION INTRAVENOUS PRN
Status: DISCONTINUED | OUTPATIENT
Start: 2019-08-07 | End: 2019-08-07

## 2019-08-07 RX ORDER — ONDANSETRON 2 MG/ML
INJECTION INTRAMUSCULAR; INTRAVENOUS PRN
Status: DISCONTINUED | OUTPATIENT
Start: 2019-08-07 | End: 2019-08-07

## 2019-08-07 RX ORDER — DEXAMETHASONE SODIUM PHOSPHATE 4 MG/ML
INJECTION, SOLUTION INTRA-ARTICULAR; INTRALESIONAL; INTRAMUSCULAR; INTRAVENOUS; SOFT TISSUE PRN
Status: DISCONTINUED | OUTPATIENT
Start: 2019-08-07 | End: 2019-08-07

## 2019-08-07 RX ORDER — PIPERACILLIN SODIUM, TAZOBACTAM SODIUM 3; .375 G/15ML; G/15ML
3.38 INJECTION, POWDER, LYOPHILIZED, FOR SOLUTION INTRAVENOUS ONCE
Status: COMPLETED | OUTPATIENT
Start: 2019-08-07 | End: 2019-08-07

## 2019-08-07 RX ORDER — SODIUM CHLORIDE, SODIUM LACTATE, POTASSIUM CHLORIDE, CALCIUM CHLORIDE 600; 310; 30; 20 MG/100ML; MG/100ML; MG/100ML; MG/100ML
INJECTION, SOLUTION INTRAVENOUS CONTINUOUS
Status: DISCONTINUED | OUTPATIENT
Start: 2019-08-07 | End: 2019-08-08

## 2019-08-07 RX ORDER — METOCLOPRAMIDE HYDROCHLORIDE 5 MG/ML
5-10 INJECTION INTRAMUSCULAR; INTRAVENOUS EVERY 6 HOURS PRN
Status: DISCONTINUED | OUTPATIENT
Start: 2019-08-07 | End: 2019-08-08 | Stop reason: HOSPADM

## 2019-08-07 RX ORDER — LIDOCAINE HYDROCHLORIDE 20 MG/ML
INJECTION, SOLUTION INFILTRATION; PERINEURAL PRN
Status: DISCONTINUED | OUTPATIENT
Start: 2019-08-07 | End: 2019-08-07

## 2019-08-07 RX ORDER — HYDRALAZINE HYDROCHLORIDE 20 MG/ML
10 INJECTION INTRAMUSCULAR; INTRAVENOUS EVERY 4 HOURS PRN
Status: DISCONTINUED | OUTPATIENT
Start: 2019-08-07 | End: 2019-08-08 | Stop reason: HOSPADM

## 2019-08-07 RX ORDER — INDOMETHACIN 50 MG/1
100 SUPPOSITORY RECTAL
Status: COMPLETED | OUTPATIENT
Start: 2019-08-07 | End: 2019-08-07

## 2019-08-07 RX ORDER — NALOXONE HYDROCHLORIDE 0.4 MG/ML
.1-.4 INJECTION, SOLUTION INTRAMUSCULAR; INTRAVENOUS; SUBCUTANEOUS
Status: DISCONTINUED | OUTPATIENT
Start: 2019-08-07 | End: 2019-08-08 | Stop reason: HOSPADM

## 2019-08-07 RX ORDER — NALOXONE HYDROCHLORIDE 0.4 MG/ML
.1-.4 INJECTION, SOLUTION INTRAMUSCULAR; INTRAVENOUS; SUBCUTANEOUS
Status: CANCELLED | OUTPATIENT
Start: 2019-08-07 | End: 2019-08-08

## 2019-08-07 RX ORDER — SODIUM CHLORIDE 9 MG/ML
1000 INJECTION, SOLUTION INTRAVENOUS CONTINUOUS
Status: CANCELLED | OUTPATIENT
Start: 2019-08-07

## 2019-08-07 RX ORDER — SODIUM CHLORIDE, SODIUM LACTATE, POTASSIUM CHLORIDE, CALCIUM CHLORIDE 600; 310; 30; 20 MG/100ML; MG/100ML; MG/100ML; MG/100ML
INJECTION, SOLUTION INTRAVENOUS CONTINUOUS
Status: CANCELLED | OUTPATIENT
Start: 2019-08-07

## 2019-08-07 RX ORDER — ONDANSETRON 2 MG/ML
4 INJECTION INTRAMUSCULAR; INTRAVENOUS EVERY 6 HOURS PRN
Status: DISCONTINUED | OUTPATIENT
Start: 2019-08-07 | End: 2019-08-08 | Stop reason: HOSPADM

## 2019-08-07 RX ORDER — SODIUM CHLORIDE, SODIUM LACTATE, POTASSIUM CHLORIDE, CALCIUM CHLORIDE 600; 310; 30; 20 MG/100ML; MG/100ML; MG/100ML; MG/100ML
500 INJECTION, SOLUTION INTRAVENOUS CONTINUOUS
Status: DISCONTINUED | OUTPATIENT
Start: 2019-08-07 | End: 2019-08-07 | Stop reason: HOSPADM

## 2019-08-07 RX ORDER — DIMENHYDRINATE 50 MG/ML
25 INJECTION, SOLUTION INTRAMUSCULAR; INTRAVENOUS EVERY 6 HOURS PRN
Status: DISCONTINUED | OUTPATIENT
Start: 2019-08-07 | End: 2019-08-08 | Stop reason: HOSPADM

## 2019-08-07 RX ORDER — SCOLOPAMINE TRANSDERMAL SYSTEM 1 MG/1
1 PATCH, EXTENDED RELEASE TRANSDERMAL
Status: DISCONTINUED | OUTPATIENT
Start: 2019-08-07 | End: 2019-08-07 | Stop reason: HOSPADM

## 2019-08-07 RX ORDER — FENTANYL CITRATE 50 UG/ML
25-50 INJECTION, SOLUTION INTRAMUSCULAR; INTRAVENOUS
Status: CANCELLED | OUTPATIENT
Start: 2019-08-07

## 2019-08-07 RX ORDER — HYDROMORPHONE HYDROCHLORIDE 1 MG/ML
.3-.5 INJECTION, SOLUTION INTRAMUSCULAR; INTRAVENOUS; SUBCUTANEOUS
Status: DISCONTINUED | OUTPATIENT
Start: 2019-08-07 | End: 2019-08-08 | Stop reason: HOSPADM

## 2019-08-07 RX ORDER — OXYCODONE HYDROCHLORIDE 5 MG/1
5-10 TABLET ORAL EVERY 4 HOURS PRN
Status: DISCONTINUED | OUTPATIENT
Start: 2019-08-07 | End: 2019-08-07 | Stop reason: HOSPADM

## 2019-08-07 RX ORDER — HYDRALAZINE HYDROCHLORIDE 20 MG/ML
5 INJECTION INTRAMUSCULAR; INTRAVENOUS ONCE
Status: DISCONTINUED | OUTPATIENT
Start: 2019-08-07 | End: 2019-08-07 | Stop reason: HOSPADM

## 2019-08-07 RX ORDER — DIMENHYDRINATE 50 MG/ML
25 INJECTION, SOLUTION INTRAMUSCULAR; INTRAVENOUS EVERY 6 HOURS PRN
Status: CANCELLED | OUTPATIENT
Start: 2019-08-07

## 2019-08-07 RX ORDER — ONDANSETRON 4 MG/1
4 TABLET, ORALLY DISINTEGRATING ORAL EVERY 6 HOURS PRN
Status: DISCONTINUED | OUTPATIENT
Start: 2019-08-07 | End: 2019-08-08 | Stop reason: HOSPADM

## 2019-08-07 RX ORDER — ONDANSETRON 4 MG/1
4 TABLET, ORALLY DISINTEGRATING ORAL EVERY 30 MIN PRN
Status: CANCELLED | OUTPATIENT
Start: 2019-08-07

## 2019-08-07 RX ORDER — LIDOCAINE 40 MG/G
CREAM TOPICAL
Status: DISCONTINUED | OUTPATIENT
Start: 2019-08-07 | End: 2019-08-07 | Stop reason: HOSPADM

## 2019-08-07 RX ADMIN — TAZOBACTAM SODIUM AND PIPERACILLIN SODIUM 3.38 G: 375; 3 INJECTION, SOLUTION INTRAVENOUS at 05:13

## 2019-08-07 RX ADMIN — SUCCINYLCHOLINE CHLORIDE 100 MG: 20 INJECTION, SOLUTION INTRAMUSCULAR; INTRAVENOUS; PARENTERAL at 13:07

## 2019-08-07 RX ADMIN — SODIUM CHLORIDE, POTASSIUM CHLORIDE, SODIUM LACTATE AND CALCIUM CHLORIDE: 600; 310; 30; 20 INJECTION, SOLUTION INTRAVENOUS at 00:41

## 2019-08-07 RX ADMIN — FENTANYL CITRATE 50 MCG: 50 INJECTION, SOLUTION INTRAMUSCULAR; INTRAVENOUS at 13:06

## 2019-08-07 RX ADMIN — SODIUM CHLORIDE, POTASSIUM CHLORIDE, SODIUM LACTATE AND CALCIUM CHLORIDE: 600; 310; 30; 20 INJECTION, SOLUTION INTRAVENOUS at 18:32

## 2019-08-07 RX ADMIN — PROPOFOL 100 MCG/KG/MIN: 10 INJECTION, EMULSION INTRAVENOUS at 13:07

## 2019-08-07 RX ADMIN — SODIUM CHLORIDE, POTASSIUM CHLORIDE, SODIUM LACTATE AND CALCIUM CHLORIDE: 600; 310; 30; 20 INJECTION, SOLUTION INTRAVENOUS at 11:18

## 2019-08-07 RX ADMIN — LOSARTAN POTASSIUM 25 MG: 25 TABLET ORAL at 21:22

## 2019-08-07 RX ADMIN — PIPERACILLIN SODIUM,TAZOBACTAM SODIUM 3.38 G: 3; .375 INJECTION, POWDER, FOR SOLUTION INTRAVENOUS at 12:27

## 2019-08-07 RX ADMIN — HYDROMORPHONE HYDROCHLORIDE 0.5 MG: 1 INJECTION, SOLUTION INTRAMUSCULAR; INTRAVENOUS; SUBCUTANEOUS at 02:32

## 2019-08-07 RX ADMIN — MIDAZOLAM 1 MG: 1 INJECTION INTRAMUSCULAR; INTRAVENOUS at 13:02

## 2019-08-07 RX ADMIN — LIDOCAINE HYDROCHLORIDE 80 MG: 20 INJECTION, SOLUTION INFILTRATION; PERINEURAL at 13:06

## 2019-08-07 RX ADMIN — PROPOFOL 200 MG: 10 INJECTION, EMULSION INTRAVENOUS at 13:06

## 2019-08-07 RX ADMIN — METOCLOPRAMIDE 10 MG: 5 INJECTION, SOLUTION INTRAMUSCULAR; INTRAVENOUS at 06:38

## 2019-08-07 RX ADMIN — DEXAMETHASONE SODIUM PHOSPHATE 4 MG: 4 INJECTION, SOLUTION INTRA-ARTICULAR; INTRALESIONAL; INTRAMUSCULAR; INTRAVENOUS; SOFT TISSUE at 13:34

## 2019-08-07 RX ADMIN — DIMENHYDRINATE 25 MG: 50 INJECTION, SOLUTION INTRAMUSCULAR; INTRAVENOUS at 05:08

## 2019-08-07 RX ADMIN — SCOPALAMINE 1 PATCH: 1 PATCH, EXTENDED RELEASE TRANSDERMAL at 11:21

## 2019-08-07 RX ADMIN — DEXMEDETOMIDINE HYDROCHLORIDE 8 MCG: 100 INJECTION, SOLUTION INTRAVENOUS at 13:06

## 2019-08-07 RX ADMIN — ONDANSETRON 4 MG: 2 INJECTION INTRAMUSCULAR; INTRAVENOUS at 13:34

## 2019-08-07 RX ADMIN — METOCLOPRAMIDE 10 MG: 5 INJECTION, SOLUTION INTRAMUSCULAR; INTRAVENOUS at 00:04

## 2019-08-07 RX ADMIN — FENTANYL CITRATE 50 MCG: 50 INJECTION, SOLUTION INTRAMUSCULAR; INTRAVENOUS at 13:28

## 2019-08-07 RX ADMIN — DEXMEDETOMIDINE HYDROCHLORIDE 12 MCG: 100 INJECTION, SOLUTION INTRAVENOUS at 13:29

## 2019-08-07 RX ADMIN — LEVOTHYROXINE SODIUM 112 MCG: 112 TABLET ORAL at 20:08

## 2019-08-07 RX ADMIN — TAZOBACTAM SODIUM AND PIPERACILLIN SODIUM 3.38 G: 375; 3 INJECTION, SOLUTION INTRAVENOUS at 00:09

## 2019-08-07 ASSESSMENT — ACTIVITIES OF DAILY LIVING (ADL)
TRANSFERRING: 0-->INDEPENDENT
RETIRED_COMMUNICATION: 0-->UNDERSTANDS/COMMUNICATES WITHOUT DIFFICULTY
DRESS: 0-->INDEPENDENT
ADLS_ACUITY_SCORE: 13
BATHING: 0-->INDEPENDENT
ADLS_ACUITY_SCORE: 13
RETIRED_EATING: 0-->INDEPENDENT
FALL_HISTORY_WITHIN_LAST_SIX_MONTHS: NO
SWALLOWING: 0-->SWALLOWS FOODS/LIQUIDS WITHOUT DIFFICULTY
AMBULATION: 0-->INDEPENDENT
COGNITION: 0 - NO COGNITION ISSUES REPORTED
ADLS_ACUITY_SCORE: 13
TOILETING: 0-->INDEPENDENT
ADLS_ACUITY_SCORE: 13

## 2019-08-07 ASSESSMENT — COPD QUESTIONNAIRES: COPD: 0

## 2019-08-07 ASSESSMENT — LIFESTYLE VARIABLES: TOBACCO_USE: 0

## 2019-08-07 NOTE — ANESTHESIA PREPROCEDURE EVALUATION
Anesthesia Pre-Procedure Evaluation    Patient: Caryn Finley   MRN: [id number] : 1955          Preoperative Diagnosis: GALL STONE PANCREATITIS    Procedure(s):  ENDOSCOPIC RETROGRADE CHOLANGIOPANCREATOGRAPHY (C ARM)    No past medical history on file.  No past surgical history on file.    Anesthesia Evaluation     . Pt has had prior anesthetic. Type: General    History of anesthetic complications   - PONV        ROS/MED HX    ENT/Pulmonary:      (-) tobacco use, asthma and COPD   Neurologic:      (-) CVA, TIA and Neuropathy   Cardiovascular:     (+) hypertension----. : . . . :. .      (-) CAD, irregular heartbeat/palpitations and stent   METS/Exercise Tolerance:     Hematologic:        (-) anemia   Musculoskeletal:         GI/Hepatic:     (+) Other GI/Hepatic gallstone pancreatitis     (-) GERD and liver disease   Renal/Genitourinary:      (-) renal disease   Endo:     (+) thyroid problem hypothyroidism, .   (-) Type I DM and Type II DM   Psychiatric:         Infectious Disease:  - neg infectious disease ROS       Malignancy:         Other:                          Physical Exam  Normal systems: cardiovascular, pulmonary and dental    Airway   Mallampati: III  TM distance: >3 FB  Neck ROM: full    Dental     Cardiovascular   Rhythm and rate: regular and normal      Pulmonary    breath sounds clear to auscultation            Lab Results   Component Value Date    WBC 7.9 2019    HGB 13.0 2019    HCT 41.2 2019     2019     2019    POTASSIUM 3.7 2019    CHLORIDE 107 2019    CO2 26 2019    BUN 9 2019    CR 0.69 2019    GLC 81 2019    ELIA 8.9 2019    ALBUMIN 3.1 (L) 2019    PROTTOTAL 6.0 (L) 2019     (HH) 2019     (H) 2019    ALKPHOS 274 (H) 2019    BILITOTAL 1.8 (H) 2019    LIPASE 2,715 (H) 2019    AMYLASE 146 (H) 2006    INR 0.88 2019       Preop  "Vitals  BP Readings from Last 3 Encounters:   08/07/19 (!) 146/89   08/07/19 (!) 143/84   07/31/07 128/76    Pulse Readings from Last 3 Encounters:   08/07/19 78   08/07/19 76      Resp Readings from Last 3 Encounters:   08/07/19 12   08/07/19 16    SpO2 Readings from Last 3 Encounters:   08/07/19 98%   08/07/19 95%      Temp Readings from Last 1 Encounters:   08/07/19 37.1  C (98.8  F) (Temporal)    Ht Readings from Last 1 Encounters:   08/06/19 0.64 m (2' 1.2\")      Wt Readings from Last 1 Encounters:   08/06/19 91.8 kg (202 lb 6.4 oz)    Estimated body mass index is 224.14 kg/m  as calculated from the following:    Height as of 8/6/19: 0.64 m (2' 1.2\").    Weight as of 8/6/19: 91.8 kg (202 lb 6.4 oz).       Anesthesia Plan      History & Physical Review  History and physical reviewed and following examination; no interval change.    ASA Status:  2 .    NPO Status:  > 6 hours    Plan for General, ETT and RSI with Intravenous induction. Maintenance will be Balanced.    PONV prophylaxis:  Ondansetron (or other 5HT-3), Dexamethasone or Solumedrol, Scopolamine patch and Other (See comment)  Additional equipment: Videolaryngoscope   Propofol infusion      Postoperative Care  Postoperative pain management:  Oral pain medications and Multi-modal analgesia.      Consents  Anesthetic plan, risks, benefits and alternatives discussed with:  Patient..                 Jens Jefferson MD  "

## 2019-08-07 NOTE — OR NURSING
ERCP note:  Procedure in OR 30.  ERCP with sphinterotomy (tritome/jag wire) and balloon sweep (3V).  No specimens obtained.  See MD report for details.

## 2019-08-07 NOTE — PROGRESS NOTES
Patient returned from [facility] for ERCP.  Did have sphincterotomy.  No obvious stone.  Moderately hypertensive.  -Clear liquid diet tonight for GI  -Repeat CBC, CMP, lipase tomorrow  -Add back her losartan 25 mg at bedtime  -PRN IV hydralazine for severe hypertension  -Resume her levothyroxine

## 2019-08-07 NOTE — PHARMACY-ADMISSION MEDICATION HISTORY
Patient admitted to [facility] and sent to [facility] for a procedure. She is readmitted today for continued care. Please see Med rec note from 08/06/2019 for information about the admission medication reconciliation.       Prior to Admission medications    Medication Sig Last Dose Taking? Auth Provider   CALCIUM 500 MG OR TABS ONE TABLET TWICE DAILY   Stevenson Galeana MD   levothyroxine (SYNTHROID/LEVOTHROID) 112 MCG tablet Take 112 mcg by mouth daily   Unknown, Entered By History   losartan (COZAAR) 25 MG tablet Take 25 mg by mouth every evening   Unknown, Entered By History   NONFORMULARY Take 1 capsule by mouth every evening (Biotic 7)   Unknown, Entered By History

## 2019-08-07 NOTE — ANESTHESIA POSTPROCEDURE EVALUATION
Patient: Caryn Finley    Procedure(s):  ENDOSCOPIC RETROGRADE CHOLANGIOPANCREATOGRAPHY WITH SYPHINCTEROTOMY    Diagnosis:GALL STONE PANCREATITIS  Diagnosis Additional Information: No value filed.    Anesthesia Type:  General, ETT, RSI    Note:  Anesthesia Post Evaluation    Patient location during evaluation: PACU  Patient participation: Able to fully participate in evaluation  Level of consciousness: awake and alert  Pain management: adequate  Airway patency: patent  Cardiovascular status: acceptable  Respiratory status: acceptable  Hydration status: acceptable  PONV: none     Anesthetic complications: None    Comments: Doing well. Being transferred back to [facility].        Last vitals:  Vitals:    08/07/19 1510 08/07/19 1520 08/07/19 1530   BP: (!) 145/103 (!) 152/94 (!) 171/94   Pulse: 77 66 68   Resp: 20 22 24   Temp:  36.3  C (97.3  F)    SpO2: 95% 93% 94%         Electronically Signed By: Jens Jefferson MD  August 7, 2019  3:48 PM

## 2019-08-07 NOTE — PROGRESS NOTES
[facility]  Hospitalist Progress Note  Nathaniel Hernández MD 08/07/19    Reason for Stay (Diagnosis): Choledocholithiasis         Assessment and Plan:      Summary of Stay:  Caryn Finley is a 63 year old female with history of hypertension, hypothyroidism, and cholecystectomy 13 years ago. She presented to her [facility] on 8/5/2019 for evaluation of abdominal pain. Labs were obtained and showed elevated LFT's (Bili T 4.1, alk phos 294, , and ). Serologies for hepatitis (A, B, and C), BMP and CBC were unremarkable. She was sent for US of abdomen which showed dilated common bile duct. Her abdominal pain worsened so she came into the ED later in the night. Labs were similar. Lipase was checked and was elevated at 29,198. She was admitted with suspected choledocholithiasis and gall stone pancreatitis. She was treated with NPO, IVF, analgesics as needed, antiemetics as needed, and Zosyn. GI was consulted.  Transferring to [facility] for ERCP then return.  Currently n.p.o. then can advance to clear liquid diet following procedure.  LFTs and lipase substantially improved.      Problem List/Assessment and Plan:   Choledocholithiasis, gallstone pancreatitis: history of previous cholecystectomy 13 years ago.  Presenting with upper abdominal pain along with elevated LFTs with bilirubin 5, ALT 1000, , alk phos 330.  Lipase significant elevated 29,000.  Abdominal ultrasound shows a dilated common bile duct.  Consistent with acute choledocholithiasis and gallstone pancreatitis.  Pain improving some with symptomatic IV fluids and n.p.o. status.  LFTs and lipase substantially better today.  -GI consulted, ERCP today, then can advance to clear liquid diet later  -Continue IV fluids, IV Dilaudid, Zofran as needed  -Was empirically on Zosyn in case of cholangitis, however no leukocytosis or fevers in favor stopping this now  -Repeat CMP and lipase tomorrow morning    HTN:  "PTA on losartan on hold.  Continue as needed hydralazine for severe hypertension.    Hypothyroidism: Resume PTA levothyroxine.      DVT Prophylaxis: Pneumatic Compression Devices  Code Status: Full Code  FEN: N.p.o., advance to clear liquid diet following procedure, IV LR at 100 ml/hr  Discharge Dispo: Home  Estimated Disch Date / # of Days until Disch: Likely tomorrow if labs stable        Interval History (Subjective):      Assumed care today.  Some persisting left-sided upper abdominal pain, but overall improved.  No longer nauseous today.  No vomiting today.  Transferring to [facility] for ERCP and then return later today.                  Physical Exam:      Last Vital Signs:  BP (!) 143/84   Pulse 76   Temp 99.7  F (37.6  C) (Oral)   Resp 16   Ht 0.64 m (2' 1.2\")   Wt 91.8 kg (202 lb 6.4 oz)   SpO2 95%   .14 kg/m        Intake/Output Summary (Last 24 hours) at 8/7/2019 1323  Last data filed at 8/7/2019 0900  Gross per 24 hour   Intake 2663 ml   Output 2055 ml   Net 608 ml       Constitutional: Awake, NAD   Eyes: sclera white   HEENT:  MMM  Respiratory:  lungs cta bilaterally, no crackles or wheeze  Cardiovascular: RRR.  No murmur   GI: Mild diffuse upper abdominal tenderness worse on the left side, no guarding, bowel sounds present  Skin: no rash   Musculoskeletal/extremities: No edema  Neurologic: A&O  Psychiatric: calm, cooperative       Medications:      All current medications were reviewed with changes reflected in problem list.         Data:      All new lab and imaging data was reviewed.   Labs:  Recent Labs   Lab 08/07/19 0548 08/06/19 0719 08/05/19  2258   WBC 7.9 9.1 8.9   HGB 13.0 13.4 15.3   HCT 41.2 41.5 45.4   MCV 98 97 94    183 231     Recent Labs   Lab 08/07/19 0548 08/06/19 0719 08/05/19  2258    141 139   POTASSIUM 3.7 3.9 4.0   CHLORIDE 107 109 106   CO2 26 26 24   ANIONGAP 6 6 9   GLC 81 122* 144*   BUN 9 8 9   CR 0.69 0.70 0.75   GFRESTIMATED >90 >90 85 "   GFRESTBLACK >90 >90 >90   ELIA 8.9 8.6 9.5   PROTTOTAL 6.0* 6.1* 7.5   ALBUMIN 3.1* 3.4 4.0   BILITOTAL 1.8* 4.3* 5.1*   ALKPHOS 274* 270* 330*   * 546* 592*   * 885* 1,000*     Recent Labs   Lab 08/07/19  0548 08/05/19  2258   LIPASE 2,715* 29,198*      Imaging:   ERCP pending      Nathaniel Hernández MD

## 2019-08-07 NOTE — PLAN OF CARE
NPO.  Left floor via HE transport at 1020 for ERCP at [facility].  Low grade tempt. x1, encouraged hourly CDB/IS.  Mild pain tolerable, declined any PRN med.  Mild nausea, declined further PRN antiemetic.  Independent.  Voiding.

## 2019-08-07 NOTE — PLAN OF CARE
Pt npo except ice chips.Vital signs stable.  Nausea with emesis x 1 this evening.  Pain intermittently in upper quadrant of abdomen.Iv dilaudid for pain control.  Nausea treated with iv reglan and iv benadryl with relief.  Encouraged inspirometer use and ambulation .  [facility].Need to be there at 11. Am.Plan of care reviewed  with pt and spouse.

## 2019-08-07 NOTE — PLAN OF CARE
A&O x4. VSS. LS CTA all fields. BS hypoactive, RUQ and LUQ pain controlled with IV dilaudid. Nausea controlled with IV dramamine and reglan. NPO. up with SBA. voiding in good amts. ERCP at [facility] today HE transport at 1015.

## 2019-08-07 NOTE — ANESTHESIA CARE TRANSFER NOTE
Patient: Caryn Finley    Procedure(s):  ENDOSCOPIC RETROGRADE CHOLANGIOPANCREATOGRAPHY WITH SYPHINCTEROTOMY    Diagnosis: GALL STONE PANCREATITIS  Diagnosis Additional Information: No value filed.    Anesthesia Type:   General, ETT, RSI     Note:  Airway :Face Mask  Patient transferred to:PACU  Comments: Rolan Report: Identifed the Patient, Identified the Reponsible Provider, Reviewed the pertinent medical history, Discussed the surgical course, Reviewed Intra-OP anesthesia mangement and issues during anesthesia, Set expectations for post-procedure period and Allowed opportunity for questions and acknowledgement of understanding      Vitals: (Last set prior to Anesthesia Care Transfer)    CRNA VITALS  8/7/2019 1324 - 8/7/2019 1401      8/7/2019             Pulse:  107    SpO2:  95 %    Resp Rate (observed):  7  (Abnormal)                 Electronically Signed By: LEYDI Marie CRNA  August 7, 2019  2:01 PM

## 2019-08-07 NOTE — PROGRESS NOTES
GI Chart Check    Patient set up for ERCP at [facility] at 1pm today. Transport arranged. Plan to return to [facility] after procedure. Discharge/readmit orders placed. Keep NPO.     Andree Wen, ZAIRA  [facility]  Office: [phone]

## 2019-08-08 VITALS
TEMPERATURE: 97.9 F | OXYGEN SATURATION: 98 % | DIASTOLIC BLOOD PRESSURE: 79 MMHG | RESPIRATION RATE: 16 BRPM | SYSTOLIC BLOOD PRESSURE: 144 MMHG | HEART RATE: 75 BPM

## 2019-08-08 LAB
ALBUMIN SERPL-MCNC: 3.2 G/DL (ref 3.4–5)
ALP SERPL-CCNC: 241 U/L (ref 40–150)
ALT SERPL W P-5'-P-CCNC: 543 U/L (ref 0–50)
ANION GAP SERPL CALCULATED.3IONS-SCNC: 5 MMOL/L (ref 3–14)
AST SERPL W P-5'-P-CCNC: 171 U/L (ref 0–45)
BILIRUB SERPL-MCNC: 1.2 MG/DL (ref 0.2–1.3)
BUN SERPL-MCNC: 12 MG/DL (ref 7–30)
CALCIUM SERPL-MCNC: 9.3 MG/DL (ref 8.5–10.1)
CHLORIDE SERPL-SCNC: 106 MMOL/L (ref 94–109)
CO2 SERPL-SCNC: 27 MMOL/L (ref 20–32)
CREAT SERPL-MCNC: 0.59 MG/DL (ref 0.52–1.04)
ERYTHROCYTE [DISTWIDTH] IN BLOOD BY AUTOMATED COUNT: 12.9 % (ref 10–15)
GFR SERPL CREATININE-BSD FRML MDRD: >90 ML/MIN/{1.73_M2}
GLUCOSE SERPL-MCNC: 106 MG/DL (ref 70–99)
HCT VFR BLD AUTO: 39.4 % (ref 35–47)
HGB BLD-MCNC: 12.7 G/DL (ref 11.7–15.7)
LIPASE SERPL-CCNC: 593 U/L (ref 73–393)
MCH RBC QN AUTO: 30.8 PG (ref 26.5–33)
MCHC RBC AUTO-ENTMCNC: 32.2 G/DL (ref 31.5–36.5)
MCV RBC AUTO: 95 FL (ref 78–100)
PLATELET # BLD AUTO: 177 10E9/L (ref 150–450)
POTASSIUM SERPL-SCNC: 3.7 MMOL/L (ref 3.4–5.3)
PROT SERPL-MCNC: 6.4 G/DL (ref 6.8–8.8)
RBC # BLD AUTO: 4.13 10E12/L (ref 3.8–5.2)
SODIUM SERPL-SCNC: 138 MMOL/L (ref 133–144)
WBC # BLD AUTO: 8.6 10E9/L (ref 4–11)

## 2019-08-08 PROCEDURE — 25000132 ZZH RX MED GY IP 250 OP 250 PS 637: Performed by: INTERNAL MEDICINE

## 2019-08-08 PROCEDURE — 25800030 ZZH RX IP 258 OP 636: Performed by: INTERNAL MEDICINE

## 2019-08-08 PROCEDURE — 85027 COMPLETE CBC AUTOMATED: CPT | Performed by: INTERNAL MEDICINE

## 2019-08-08 PROCEDURE — 80053 COMPREHEN METABOLIC PANEL: CPT | Performed by: INTERNAL MEDICINE

## 2019-08-08 PROCEDURE — 36415 COLL VENOUS BLD VENIPUNCTURE: CPT | Performed by: INTERNAL MEDICINE

## 2019-08-08 PROCEDURE — 99239 HOSP IP/OBS DSCHRG MGMT >30: CPT | Performed by: INTERNAL MEDICINE

## 2019-08-08 PROCEDURE — 83690 ASSAY OF LIPASE: CPT | Performed by: INTERNAL MEDICINE

## 2019-08-08 RX ORDER — ACETAMINOPHEN 650 MG/1
650 SUPPOSITORY RECTAL EVERY 4 HOURS PRN
Status: DISCONTINUED | OUTPATIENT
Start: 2019-08-08 | End: 2019-08-08 | Stop reason: HOSPADM

## 2019-08-08 RX ORDER — ACETAMINOPHEN 325 MG/1
650 TABLET ORAL EVERY 4 HOURS PRN
Status: DISCONTINUED | OUTPATIENT
Start: 2019-08-08 | End: 2019-08-08 | Stop reason: HOSPADM

## 2019-08-08 RX ORDER — IBUPROFEN 600 MG/1
600 TABLET, FILM COATED ORAL EVERY 8 HOURS PRN
Status: DISCONTINUED | OUTPATIENT
Start: 2019-08-08 | End: 2019-08-08 | Stop reason: HOSPADM

## 2019-08-08 RX ADMIN — LEVOTHYROXINE SODIUM 112 MCG: 112 TABLET ORAL at 08:34

## 2019-08-08 RX ADMIN — SODIUM CHLORIDE, POTASSIUM CHLORIDE, SODIUM LACTATE AND CALCIUM CHLORIDE: 600; 310; 30; 20 INJECTION, SOLUTION INTRAVENOUS at 03:23

## 2019-08-08 RX ADMIN — IBUPROFEN 600 MG: 600 TABLET ORAL at 03:43

## 2019-08-08 ASSESSMENT — ACTIVITIES OF DAILY LIVING (ADL)
ADLS_ACUITY_SCORE: 11
ADLS_ACUITY_SCORE: 13

## 2019-08-08 NOTE — PLAN OF CARE
Vs low grade fever at hs, encouraged ambulation.Pt returned from [facility] following ERCP.  Lungs clear.  Bowel sounds hypoactive, c/o slight abdominal discomfort, denies nausea.Tolerated ice chips and sips of clear liquids.  Voiding.  Plan of care reviewed with pt and spouse.

## 2019-08-08 NOTE — DISCHARGE SUMMARY
[facility]  Discharge Summary  Name: Caryn Finley    MRN: [id number]  YOB: 1955    Age: 63 year old  Date of Discharge:  8/8/2019  Date of Admission: 8/7/2019  Primary Care Provider: Tawny Cuenca  Discharge Physician:  Nathaniel Hernández MD  Discharging Service:  Hospitalist      Discharge Diagnoses:  Choledocholithiasis  Gallstone pancreatitis  HTN  Hypothyroidism     Hospital Course:  Summary of Stay:  Caryn Finley is a 63 year old female with history of hypertension, hypothyroidism, and cholecystectomy 13 years ago. She presented to her [facility] on 8/5/2019 for evaluation of abdominal pain. Labs were obtained and showed elevated LFT's (Bili T 4.1, alk phos 294, , and ). Serologies for hepatitis (A, B, and C), BMP and CBC were unremarkable. She was sent for US of abdomen which showed dilated common bile duct. Her abdominal pain worsened so she came into the ED later in the night. Labs were similar. Lipase was checked and was elevated at 29,198. She was admitted with suspected choledocholithiasis and gall stone pancreatitis. She was treated with NPO, IVF, analgesics as needed, antiemetics as needed, and Zosyn. GI was consulted.    Transferred to [facility] for ERCP where sphincterotomy was performed and she was found to have some sludge and debris, but no stone.   Zosyn was discontinued.  LFTs and lipase substantially improved although not completely normalized.  Tolerating low-fat diet without abdominal pain.  Follow-up with primary care doctor in 1 to 2 weeks with repeat LFTs.         Problem List/Assessment and Plan:   Choledocholithiasis, gallstone pancreatitis: history of previous cholecystectomy 13 years ago.  Presenting with upper abdominal pain along with elevated LFTs with bilirubin 5, ALT 1000, , alk phos 330.  Lipase significant elevated 29,000.  Abdominal ultrasound shows a dilated common bile duct.  Consistent with  acute choledocholithiasis and gallstone pancreatitis.  Pain improving some with symptomatic IV fluids and n.p.o. status.  LFTs and lipase substantially better today.  -GI consulted, doing ERCP on 8/7 at [facility].  Sphincterotomy was performed and found to have sludge and debris, but no stone.  Given rapid improvement in labs the morning prior to ERCP it is possible that she passed a stone  -Empiric Zosyn stopped as no evidence for cholangitis  -CMP and lipase improving significantly each day, not completely normalized as of yet and recommend follow-up LFTs with primary care doctor in 1 to 2 weeks for resolution  -Tolerating low-fat diet without pain     HTN: PTA losartan 25 mg daily resumed.     Hypothyroidism: Resumed PTA levothyroxine.     Discharge Disposition:  Discharged to home     Allergies:  Allergies   Allergen Reactions     Sulfa Drugs Swelling     Tongue,lips        Discharge Medications:   Current Discharge Medication List      CONTINUE these medications which have NOT CHANGED    Details   CALCIUM 500 MG OR TABS ONE TABLET TWICE DAILY  Qty: 3 MONTHS, Refills: 1 YEAR      levothyroxine (SYNTHROID/LEVOTHROID) 112 MCG tablet Take 112 mcg by mouth daily      losartan (COZAAR) 25 MG tablet Take 25 mg by mouth every evening      NONFORMULARY Take 1 capsule by mouth every evening (Biotic 7)              Condition on Discharge:  Discharge condition: Stable   Discharge vitals: Blood pressure (!) 144/79, pulse 75, temperature 97.9  F (36.6  C), temperature source Oral, resp. rate 16, SpO2 98 %.   Code status on discharge: Full Code     History of Illness:  See detailed admission note for full details.    Physical Exam:  Blood pressure (!) 144/79, pulse 75, temperature 97.9  F (36.6  C), temperature source Oral, resp. rate 16, SpO2 98 %.  Wt Readings from Last 1 Encounters:   08/06/19 91.8 kg (202 lb 6.4 oz)     Constitutional: Awake, NAD   Eyes: sclera white   HEENT:  MMM  Respiratory:  lungs cta  bilaterally, no crackles or wheeze  Cardiovascular: RRR.  No murmur   GI: Slight tenderness to epigastric and left upper quadrant to palpation without guarding, not distended, bowel sounds present  Skin: no rash    Musculoskeletal/extremities:   No edema  Neurologic: A&O, speech clear   Psychiatric: calm, cooperative, normal affect    Procedures other than Imaging:  ERCP:  Findings:        The major papilla was normal. The minor papilla was not seen. A long        0.035 inch Soft Jagwire was selectively passed into the biliary tree.        The short-nosed traction sphincterotome was passed over the guidewire        and the bile duct was then deeply cannulated. Contrast was injected. I        personally interpreted the bile duct images. Ductal flow of contrast was        adequate. Image quality was adequate. Contrast extended to the hepatic        ducts. The main bile duct was moderately dilated, acquired. The largest        diameter was 13 mm. A 10 mm biliary sphincterotomy was made with a        monofilament traction (standard) sphincterotome using ERBE        electrocautery. There was no post-sphincterotomy bleeding. The biliary        tree was swept with an 11.5 mm balloon and 15 mm balloon starting at the        right main hepatic duct. Debris was swept from the duct. Cholangiograms        showed air bubbles but no stones. The endoscope was withdrawn from the        patient.                                                                                     Impression:               - The major papilla appeared normal.                             - Minor papilla not seen.                             - The entire main bile duct was moderately dilated,                             acquired.                             - A biliary sphincterotomy was performed.                             - The biliary tree was swept and debris was found.      Imaging:  Results for orders placed or performed during the hospital  encounter of 08/07/19   XR ERCP    Narrative    ERCP  8/7/2019 1:45 PM     HISTORY: ERCP, OR 30, 4 images, 1.9 minutes fluoroscopy time.    COMPARISON: None.      Impression    IMPRESSION: Four spot fluoroscopic images obtained during ERCP.  Cholecystectomy clips identified. Minimal intrahepatic biliary  dilatation as well as extrahepatic biliary dilatation. Total  fluoroscopic time was 1.9 minutes.    JOCELYN BROWN MD        Consultations:  Consultation during this admission received from gastroenterology.       Recent Lab Results:  Recent Labs   Lab 08/08/19 0720 08/07/19  0548 08/06/19  0719   WBC 8.6 7.9 9.1   HGB 12.7 13.0 13.4   HCT 39.4 41.2 41.5   MCV 95 98 97    165 183     Recent Labs   Lab 08/08/19 0720 08/07/19  0548 08/06/19  0719    139 141   POTASSIUM 3.7 3.7 3.9   CHLORIDE 106 107 109   CO2 27 26 26   ANIONGAP 5 6 6   * 81 122*   BUN 12 9 8   CR 0.59 0.69 0.70   GFRESTIMATED >90 >90 >90   GFRESTBLACK >90 >90 >90   ELIA 9.3 8.9 8.6   PROTTOTAL 6.4* 6.0* 6.1*   ALBUMIN 3.2* 3.1* 3.4   BILITOTAL 1.2 1.8* 4.3*   ALKPHOS 241* 274* 270*   * 286* 546*   * 698* 885*     Recent Labs   Lab 08/08/19 0720 08/07/19  0548 08/05/19  2258   LIPASE 593* 2,715* 29,198*          Pending Results:    Unresulted Labs Ordered in the Past 30 Days of this Admission     Date and Time Order Name Status Description    8/6/2019 0002 Blood culture Preliminary     8/6/2019 0002 Blood culture Preliminary          These results will be followed up by patient's primary care provider.    Discharge Instructions and Follow-Up:   Discharge Procedure Orders   Reason for your hospital stay   Order Comments: You were hospitalized for abdominal pain and elevated liver tests and pancreas enzymes due to suspected blockage of the biliary system.  Although no gallstone was seen after ERCP some sludge and debris was removed.  It is possible that you passed a stone already.  Your labs improved significantly  each day.  Recommend repeat labs in 1 to 2 weeks in clinic to see if they completely resolved.     Follow-up and recommended labs and tests    Order Comments: Follow up with primary care provider, Tawny Cuenca, within 10-14 days for hospital follow- up.  The following labs/tests are recommended: LFTs.     Activity   Order Comments: Your activity upon discharge: activity as tolerated     Order Specific Question Answer Comments   Is discharge order? Yes      Full Code     Order Specific Question Answer Comments   Code status determined by: Discussion with patient/legal decision maker      Diet   Order Comments: Follow this diet upon discharge: Orders Placed This Encounter      Advance Diet as Tolerated:  Low Fat Diet     Order Specific Question Answer Comments   Is discharge order? Yes          I, Nathaniel Hernández, personally saw the patient today and spent greater than 30 minutes discharging this patient.    Nathaniel Hernández MD

## 2019-08-08 NOTE — PROGRESS NOTES
Headache overnight for which she took ibuprofen.  Having no abdominal pain.  Minimally tender on exam which she describes as soreness.  No nausea.  Tolerating clear liquids.  LFTs and lipase improving daily.  -ADAT  -Likely can discharge later today with follow-up labs with PCP in 1-2 weeks to make sure normalized

## 2019-08-08 NOTE — PLAN OF CARE
VSS. Prn ibuprofen x1 for headache. Active bowel sounds, passing gas. No nausea- did try jello and tolerated well. Continues on IVF. Up SBA. Clear liquid diet.

## 2019-08-12 LAB
BACTERIA SPEC CULT: NO GROWTH
BACTERIA SPEC CULT: NO GROWTH
Lab: NORMAL
Lab: NORMAL
SPECIMEN SOURCE: NORMAL
SPECIMEN SOURCE: NORMAL

## 2021-07-29 NOTE — PLAN OF CARE
A&O. VSS. Ambulates independently. Tolerating regular diet, denies abdominal pain and nausea with intake. Plan is to discharge later today, awaiting orders.    16

## 2022-02-14 ENCOUNTER — THERAPY VISIT (OUTPATIENT)
Dept: PHYSICAL THERAPY | Facility: CLINIC | Age: 67
End: 2022-02-14
Payer: COMMERCIAL

## 2022-02-14 DIAGNOSIS — M25.512 ACUTE PAIN OF LEFT SHOULDER: ICD-10-CM

## 2022-02-14 PROCEDURE — 97110 THERAPEUTIC EXERCISES: CPT | Mod: GP | Performed by: PHYSICAL THERAPIST

## 2022-02-14 PROCEDURE — 97161 PT EVAL LOW COMPLEX 20 MIN: CPT | Mod: GP | Performed by: PHYSICAL THERAPIST

## 2022-02-14 NOTE — PROGRESS NOTES
[facility]    OUTPATIENT Physical Therapy ORTHOPEDIC EVALUATION  PLAN OF TREATMENT FOR OUTPATIENT REHABILITATION  (COMPLETE FOR INITIAL CLAIMS ONLY)  Patient's Last Name, First Name, M.I.  YOB: 1955  MaliaCaryndeja HUSSEIN    Provider s Name:  [facility]   Medical Record No.  [id number]   Start of Care Date:  02/14/22   Onset Date:   02/11/22 (MD order date)   Type:     _X__PT   ___OT Medical Diagnosis:    Encounter Diagnosis   Name Primary?     Acute pain of left shoulder         Treatment Diagnosis:  L shoulder pain        Goals:     02/14/22 0500   Body Part   Goals listed below are for L shoulder pain   Goal #1   Goal #1 reaching   Previous Functional Level No restrictions   Current Functional Level Can reach ;overhead   Performance level pain 9/10   STG Target Performance Reach ;overhead   Performance level pain 5/10   Rationale for independent personal hygiene;for dressing;for bathing   Due date 03/07/22   LTG Target Performance Reach;overhead   Performance Level pain 2/10   Rationale for independent personal hygiene;for dressing;for bathing   Due date 04/11/22       Therapy Frequency:  1 x week  Predicted Duration of Therapy Intervention:  8 weeks     Rm Meade, PT                 I CERTIFY THE NEED FOR THESE SERVICES FURNISHED UNDER        THIS PLAN OF TREATMENT AND WHILE UNDER MY CARE .             Physician Signature               Date    X_____________________________________________________                             Certification Date From:  02/14/22   Certification Date To:  04/11/22    Referring Provider:  Letitia Childress    Initial Assessment        See Epic Evaluation SOC Date: 02/14/22

## 2022-02-14 NOTE — PROGRESS NOTES
Physical Therapy Initial Evaluation  Subjective:  The history is provided by the patient. No  was used.   Patient Health History  Caryn Finley being seen for L shoulder pain.     Date of Onset: one  month.   Problem occurred: unknown   Pain is reported as 9/10 on pain scale.  General health as reported by patient is good.  Pertinent medical history includes: high blood pressure, overweight, migraines/headaches and numbness/tingling.   Red flags:  None as reported by patient.  Medical allergies: See EPIC.   Surgeries include:  None.    Current medications:  High blood pressure medication, pain medication and thyroid medication.    Current occupation is Retired.   Primary job tasks include:  Driving.                  Therapist Generated HPI Evaluation  Problem details: Pt. complains of left shoulder pain that has been present for one month.  No known trauma.  PT order dated 2/11/22.      .         Type of problem:  Left shoulder.    This is a new condition.  Condition occurred with:  Unknown cause.  Where condition occurred: for unknown reasons.  Patient reports pain:  In the joint and upper arm.  Pain is described as aching and is intermittent.  Pain radiates to:  Shoulder.   Since onset symptoms are unchanged.  Associated symptoms:  Loss of motion/stiffness and loss of strength.  and relieved by rest.  Imaging testing: none.    Barriers include:  None as reported by patient.                        Objective:        Flexibility/Screens:   Positive screens:  ShoulderNegative screens: Cervical                              Shoulder Evaluation:  ROM:  AROM:  normal  Flexion:  Left:  90        Abduction:  Left: 90       Internal Rotation:  Left:  L1    Right:  T8  External Rotation:  Left:  Top of head    Right:  Occiput                PROM:  normal  Flexion:  Left:  125          Abduction:  Left:  115        Internal Rotation:  Left:  61      External Rotation:  Left:  55                     Pain: ERP with PROM of left shoulder ER, IR, abd and flexion   Endfeel: Empty end feel with left shoulder flexion, abd, ER and IR      Special Tests:      Left shoulder negative for the following special tests:  Impingement; Rotator cuff tear and Acromioclavicular    Right shoulder negative for the following special tests:Impingement; Rotator cuff tear and Acrimioclavicular  Palpation:  normal                                         General     ROS    Assessment/Plan:    Patient is a 66 year old female with left side shoulder complaints.    Patient has the following significant findings with corresponding treatment plan.                Diagnosis 1:  L shoulder pain  Pain -  self management, education, directional preference exercise and home program  Decreased ROM/flexibility - manual therapy and therapeutic exercise  Decreased strength - therapeutic exercise and therapeutic activities  Impaired balance - neuro re-education and therapeutic activities  Impaired muscle performance - neuro re-education  Decreased function - therapeutic activities    Therapy Evaluation Codes:   1) Clinical presentation characteristics are:   Stable/Uncomplicated.  2) Decision-Making    Low complexity using standardized patient assessment instrument and/or measureable assessment of functional outcome.  Cumulative Therapy Evaluation is: Low complexity.    Previous and current functional limitations:  (See Goal Flow Sheet for this information)    Short term and Long term goals: (See Goal Flow Sheet for this information)     Communication ability:  Patient appears to be able to clearly communicate and understand verbal and written communication and follow directions correctly.  Treatment Explanation - The following has been discussed with the patient:   RX ordered/plan of care  Anticipated outcomes  Possible risks and side effects  This patient would benefit from PT intervention to resume normal activities.   Rehab potential is  good.    Frequency:  1 X week, once daily  Duration:  for 8 weeks  Discharge Plan:  Achieve all LTG.  Independent in home treatment program.  Reach maximal therapeutic benefit.    Please refer to the daily flowsheet for treatment today, total treatment time and time spent performing 1:1 timed codes.

## 2022-02-24 ENCOUNTER — THERAPY VISIT (OUTPATIENT)
Dept: PHYSICAL THERAPY | Facility: CLINIC | Age: 67
End: 2022-02-24
Payer: COMMERCIAL

## 2022-02-24 DIAGNOSIS — M25.512 ACUTE PAIN OF LEFT SHOULDER: ICD-10-CM

## 2022-02-24 PROCEDURE — 97110 THERAPEUTIC EXERCISES: CPT | Mod: GP | Performed by: PHYSICAL THERAPIST

## 2022-03-25 ENCOUNTER — THERAPY VISIT (OUTPATIENT)
Dept: PHYSICAL THERAPY | Facility: CLINIC | Age: 67
End: 2022-03-25
Payer: COMMERCIAL

## 2022-03-25 DIAGNOSIS — M25.512 ACUTE PAIN OF LEFT SHOULDER: ICD-10-CM

## 2022-03-25 PROCEDURE — 97110 THERAPEUTIC EXERCISES: CPT | Mod: GP | Performed by: PHYSICAL THERAPIST

## 2022-03-25 NOTE — PROGRESS NOTES
DISCHARGE REPORT    Progress reporting period is from eval to 3/25/22.       SUBJECTIVE  Subjective changes noted by patient:  .  Subjective: Better.  Functioning at 90% of where she wants     Current pain level is  Current Pain level: 4/10.     Previous pain level was   Initial Pain level: 9/10.   Changes in function:  Yes (See Goal flowsheet attached for changes in current functional level)  Adverse reaction to treatment or activity: None    OBJECTIVE  Changes noted in objective findings:  Yes,   Objective: AROM WNL with ERP with combined ext/ir,  strength 4+/5 generally     ASSESSMENT/PLAN  Updated problem list and treatment plan: Diagnosis 1:  Shoulder pain   Pain -  self management, education, directional preference exercise and home program  Decreased ROM/flexibility - manual therapy and therapeutic exercise  Decreased function - therapeutic activities  STG/LTGs have been met or progress has been made towards goals:  Yes (See Goal flow sheet completed today.)  Assessment of Progress: The patient's condition is improving.  The patient's condition has potential to improve.  Self Management Plans:  Patient has been instructed in a home treatment program.  Patient is independent in a home treatment program.  Patient  has been instructed in self management of symptoms.  I have re-evaluated this patient and find that the nature, scope, duration and intensity of the therapy is appropriate for the medical condition of the patient.      Recommendations:  This patient is ready to be discharged from therapy and continue their home treatment program.    Please refer to the daily flowsheet for treatment today, total treatment time and time spent performing 1:1 timed codes.

## 2023-07-17 ENCOUNTER — TRANSCRIBE ORDERS (OUTPATIENT)
Dept: OTHER | Age: 68
End: 2023-07-17

## 2023-07-17 DIAGNOSIS — M41.9 SCOLIOSIS, UNSPECIFIED SCOLIOSIS TYPE, UNSPECIFIED SPINAL REGION: ICD-10-CM

## 2023-07-17 DIAGNOSIS — M54.50 ACUTE LEFT-SIDED LOW BACK PAIN, UNSPECIFIED WHETHER SCIATICA PRESENT: ICD-10-CM

## 2023-07-17 DIAGNOSIS — M54.50 LOW BACK PAIN: Primary | ICD-10-CM

## 2023-09-21 ENCOUNTER — THERAPY VISIT (OUTPATIENT)
Dept: PHYSICAL THERAPY | Facility: CLINIC | Age: 68
End: 2023-09-21
Attending: NURSE PRACTITIONER
Payer: COMMERCIAL

## 2023-09-21 DIAGNOSIS — G89.29 CHRONIC BILATERAL LOW BACK PAIN WITHOUT SCIATICA: Primary | ICD-10-CM

## 2023-09-21 DIAGNOSIS — M41.9 SCOLIOSIS, UNSPECIFIED SCOLIOSIS TYPE, UNSPECIFIED SPINAL REGION: ICD-10-CM

## 2023-09-21 DIAGNOSIS — M54.50 CHRONIC BILATERAL LOW BACK PAIN WITHOUT SCIATICA: Primary | ICD-10-CM

## 2023-09-21 PROCEDURE — 97110 THERAPEUTIC EXERCISES: CPT | Mod: GP | Performed by: PHYSICAL THERAPIST

## 2023-09-21 PROCEDURE — 97161 PT EVAL LOW COMPLEX 20 MIN: CPT | Mod: GP | Performed by: PHYSICAL THERAPIST

## 2023-09-21 NOTE — PROGRESS NOTES
PHYSICAL THERAPY EVALUATION  Type of Visit: Evaluation    See electronic medical record for Abuse and Falls Screening details.    Subjective   Patient reports to PT for low back pain that started in May when she got out of the shower and twisted and bent over and the pain went shooting all over. First 3 days it was so bad all she could do is lay down, but since then the pain has stayed the same.  The pain is always a constant ache but increased with certain movements. Heat and Tylenol help, did have a Medrol Dose pack that helped but then the pain came back. Pain is increased with prolonged walking, sleeping, prolonged sitting and standing and bending.       Presenting condition or subjective complaint: lower back paiin  Date of onset: 05/01/23    Relevant medical history:     Dates & types of surgery:      Prior diagnostic imaging/testing results: X-ray     Prior therapy history for the same diagnosis, illness or injury: No      Living Environment  Social support: With a significant other or spouse   Type of home: House   Stairs to enter the home: No       Ramp: No   Stairs inside the home: Yes 13 Is there a railing: Yes   Help at home: None  Equipment owned:       Employment: No    Hobbies/Interests: sewing bicycling    Patient goals for therapy: bend-flexation of lower back       Objective   LUMBAR SPINE EVALUATION  PAIN: Pain Level at Rest: 3/10  Pain Level with Use: 6/10  Pain Location: across the entire low back  Pain is Exacerbated By: prolonged sitting, prolonged walking, prolonged standing, bending and sleeping  Pain is Relieved By: heat, NSAIDs, and otc medications  WEIGHTBEARING ALIGNMENT: Lumbar/Pelvic WB Alignment:Convex scoliosis L  ROM:  Flexion min loss increased pain, Extension mod loss no pain, rotation B mod loss tight per pt  PELVIC/SI SCREEN: WNL  STRENGTH:  LE strength WNL, lower abdominal strength poor  MYOTOMES: WNL  DTR S: WNL  CORD SIGNS: WNL  DERMATOMES: WNL  NEURAL TENSION: Lumbar  WNL  FLEXIBILITY: WNL  PALPATION:   + Tenderness At Location Left Right   Quadratus Lumborum + +   Erector Spinae     Piriformis      PSIS + +   ASIS     Iliac Crest     Glut Medius     Greater Trochanter     Ischial Tuberosity     Hamstrings     Hip Flexors     Vertebral          Assessment & Plan   CLINICAL IMPRESSIONS  Medical Diagnosis: Low back pain  Acute left-sided low back pain, unspecified whether sciatica present  Scoliosis, unspecified scoliosis type, unspecified spinal region    Treatment Diagnosis: LBP   Impression/Assessment: Patient is a 68 year old female with low back complaints.  The following significant findings have been identified: Pain, Decreased ROM/flexibility, Decreased strength, Impaired muscle performance, Decreased activity tolerance, and Impaired posture. These impairments interfere with their ability to perform self care tasks, recreational activities, household chores, driving , household mobility, and community mobility as compared to previous level of function.     Clinical Decision Making (Complexity):  Clinical Presentation: Stable/Uncomplicated  Clinical Presentation Rationale: based on medical and personal factors listed in PT evaluation  Clinical Decision Making (Complexity): Low complexity    PLAN OF CARE  Treatment Interventions:  Modalities: Cryotherapy  Interventions: Manual Therapy, Neuromuscular Re-education, Therapeutic Activity, Therapeutic Exercise, Self-Care/Home Management    Long Term Goals     PT Goal 1  Goal Identifier: LTG 1  Goal Description: Patient will be able to bend with pain at worst 2/10  Rationale: to maximize safety and independence with performance of ADLs and functional tasks;to maximize safety and independence within the home;to maximize safety and independence within the community;to maximize safety and independence with transportation;to maximize safety and independence with self cares  Target Date: 11/16/23  PT Goal 2  Goal Identifier: LTG 2  Goal  Description: Patient will be able to walk 1 mile with 1/10 pain at worst  Rationale: to maximize safety and independence with performance of ADLs and functional tasks;to maximize safety and independence within the community;to maximize safety and independence within the home  Target Date: 11/16/23      Frequency of Treatment: 1 x a week  Duration of Treatment: 8 weeks    Recommended Referrals to Other Professionals:   Education Assessment:   Learner/Method: No Barriers to Learning  Education Comments: online    Risks and benefits of evaluation/treatment have been explained.   Patient/Family/caregiver agrees with Plan of Care.     Evaluation Time:     PT Eval, Low Complexity Minutes (89339): 22       Signing Clinician: GOOD Russo [facility]                                                                                   OUTPATIENT PHYSICAL THERAPY      PLAN OF TREATMENT FOR OUTPATIENT REHABILITATION   Patient's Last Name, First Name, GINNYELADIO  WicloverCaryn vanessa YOB: 1955   Provider's Name   [facility]   Medical Record No.  [id number]     Onset Date: 05/01/23  Start of Care Date: 09/21/23     Medical Diagnosis:  Low back pain  Acute left-sided low back pain, unspecified whether sciatica present  Scoliosis, unspecified scoliosis type, unspecified spinal region      PT Treatment Diagnosis:  LBP Plan of Treatment  Frequency/Duration: 1 x a week/ 8 weeks    Certification date from 09/21/23 to 11/16/23         See note for plan of treatment details and functional goals     Shelton Morales, PT                         I CERTIFY THE NEED FOR THESE SERVICES FURNISHED UNDER        THIS PLAN OF TREATMENT AND WHILE UNDER MY CARE     (Physician attestation of this document indicates review and certification of the therapy plan).                Referring Provider:  Flores Crowley    Signature:  ___________________________________    Initial  Assessment  See Epic Evaluation- Start of Care Date: 09/21/23

## 2023-10-05 ENCOUNTER — THERAPY VISIT (OUTPATIENT)
Dept: PHYSICAL THERAPY | Facility: CLINIC | Age: 68
End: 2023-10-05
Payer: COMMERCIAL

## 2023-10-05 DIAGNOSIS — G89.29 CHRONIC BILATERAL LOW BACK PAIN WITHOUT SCIATICA: Primary | ICD-10-CM

## 2023-10-05 DIAGNOSIS — M54.50 CHRONIC BILATERAL LOW BACK PAIN WITHOUT SCIATICA: Primary | ICD-10-CM

## 2023-10-05 DIAGNOSIS — M41.9 SCOLIOSIS, UNSPECIFIED SCOLIOSIS TYPE, UNSPECIFIED SPINAL REGION: ICD-10-CM

## 2023-10-05 PROCEDURE — 97110 THERAPEUTIC EXERCISES: CPT | Mod: GP | Performed by: PHYSICAL THERAPIST

## 2023-10-26 ENCOUNTER — THERAPY VISIT (OUTPATIENT)
Dept: PHYSICAL THERAPY | Facility: CLINIC | Age: 68
End: 2023-10-26
Payer: COMMERCIAL

## 2023-10-26 DIAGNOSIS — M41.9 SCOLIOSIS, UNSPECIFIED SCOLIOSIS TYPE, UNSPECIFIED SPINAL REGION: ICD-10-CM

## 2023-10-26 DIAGNOSIS — G89.29 CHRONIC BILATERAL LOW BACK PAIN WITHOUT SCIATICA: Primary | ICD-10-CM

## 2023-10-26 DIAGNOSIS — M54.50 CHRONIC BILATERAL LOW BACK PAIN WITHOUT SCIATICA: Primary | ICD-10-CM

## 2023-10-26 PROCEDURE — 97110 THERAPEUTIC EXERCISES: CPT | Mod: GP | Performed by: PHYSICAL THERAPIST

## 2023-10-26 NOTE — PROGRESS NOTES
DISCHARGE  Reason for Discharge: Patient has met 1 goal and making great progress towards 2nd goal. She will continue with HEP and be DC.    Equipment Issued:     Discharge Plan: Patient to continue home program.    Referring Provider:  Flores Crowley       10/26/23 0500   Appointment Info   Signing clinician's name / credentials Shelton Morales DPT   Total/Authorized Visits 8 (E&T)   Visits Used 3   Medical Diagnosis Low back pain  Acute left-sided low back pain, unspecified whether sciatica present  Scoliosis, unspecified scoliosis type, unspecified spinal region   PT Tx Diagnosis LBP   Quick Adds Certification   Progress Note/Certification   Start of Care Date 09/21/23   Onset of illness/injury or Date of Surgery 05/01/23   Therapy Frequency 1 x a week   Predicted Duration 8 weeks   Certification date from 09/21/23   Certification date to 11/16/23   Progress Note Completed Date 09/21/23   GOALS   PT Goals 2   PT Goal 1   Goal Identifier LTG 1   Goal Description Patient will be able to bend with pain at worst 2/10   Rationale to maximize safety and independence with performance of ADLs and functional tasks;to maximize safety and independence within the home;to maximize safety and independence within the community;to maximize safety and independence with transportation;to maximize safety and independence with self cares   Target Date 11/16/23   Date Met 10/26/23   PT Goal 2   Goal Identifier LTG 2   Goal Description Patient will be able to walk 1 mile with 1/10 pain at worst   Rationale to maximize safety and independence with performance of ADLs and functional tasks;to maximize safety and independence within the community;to maximize safety and independence within the home   Goal Progress has not tried walking 1 mile yet   Target Date 11/16/23   Subjective Report   Subjective Report States she is doing great and feels she is 98% better. Notices some pain when washing dishes other wise has felt great.    Treatment Interventions (PT)   Interventions Therapeutic Procedure/Exercise   Therapeutic Procedure/Exercise   Therapeutic Procedures Ther Proc 2;Ther Proc 3;Ther Proc 4;Ther Proc 5;Ther Proc 6   Ther Proc 1 Education   Ther Proc 1 - Details rev POC and HEP   Ther Proc 2 SL clams   Ther Proc 2 - Details rev   Ther Proc 3 abdominal march #3   Ther Proc 3 - Details rev   Ther Proc 4 Knee bends   Ther Proc 4 - Details rev   PTRx Ther Proc 1 Double Knee to Chest   PTRx Ther Proc 1 - Details HEP   PTRx Ther Proc 2 Supine Lumbar Hip Roll   PTRx Ther Proc 2 - Details HEP   PTRx Ther Proc 3 Posterior Pelvic Tilt   PTRx Ther Proc 3 - Details HEP   PTRx Ther Proc 4 Bridging #1   PTRx Ther Proc 4 - Details rev   Skilled Intervention cued correct abdominal activation and how to conitnue with HEP   Patient Response/Progress tolerated well   Ther Proc 5 pulldown   Ther Proc 5 - Details GTB x10-30   Ther Proc 6 row   Ther Proc 6 - Details GTB x 10-30   Education   Learner/Method No Barriers to Learning   Education Comments online   Plan   Home program PTRX   Plan for next session continue to progress core strength

## 2024-01-07 ENCOUNTER — HEALTH MAINTENANCE LETTER (OUTPATIENT)
Age: 69
End: 2024-01-07

## 2025-01-25 ENCOUNTER — HEALTH MAINTENANCE LETTER (OUTPATIENT)
Age: 70
End: 2025-01-25

## 2025-02-22 ENCOUNTER — HEALTH MAINTENANCE LETTER (OUTPATIENT)
Age: 70
End: 2025-02-22

## (undated) DEVICE — DRSG GAUZE 4X4" 3033

## (undated) DEVICE — SOL NACL 0.9% IRRIG 1000ML BOTTLE 2F7124

## (undated) DEVICE — ESU GROUND PAD ADULT W/CORD E7507

## (undated) DEVICE — NDL CANNULA INTERLINK BLUNT 18GA

## (undated) DEVICE — RAD RX ISOVUE 300 (50ML) 61% IOPAMIDOL CHARGE PER ML

## (undated) DEVICE — SOL WATER IRRIG 1000ML BOTTLE 2F7114

## (undated) RX ORDER — FENTANYL CITRATE 50 UG/ML
INJECTION, SOLUTION INTRAMUSCULAR; INTRAVENOUS
Status: DISPENSED
Start: 2019-08-07

## (undated) RX ORDER — HYDRALAZINE HYDROCHLORIDE 20 MG/ML
INJECTION INTRAMUSCULAR; INTRAVENOUS
Status: DISPENSED
Start: 2019-08-07

## (undated) RX ORDER — SCOLOPAMINE TRANSDERMAL SYSTEM 1 MG/1
PATCH, EXTENDED RELEASE TRANSDERMAL
Status: DISPENSED
Start: 2019-08-07

## (undated) RX ORDER — PROPOFOL 10 MG/ML
INJECTION, EMULSION INTRAVENOUS
Status: DISPENSED
Start: 2019-08-07

## (undated) RX ORDER — PIPERACILLIN SODIUM, TAZOBACTAM SODIUM 3; .375 G/15ML; G/15ML
INJECTION, POWDER, LYOPHILIZED, FOR SOLUTION INTRAVENOUS
Status: DISPENSED
Start: 2019-08-07

## (undated) RX ORDER — INDOMETHACIN 50 MG/1
SUPPOSITORY RECTAL
Status: DISPENSED
Start: 2019-08-07